# Patient Record
Sex: MALE | Race: WHITE | NOT HISPANIC OR LATINO | Employment: PART TIME
[De-identification: names, ages, dates, MRNs, and addresses within clinical notes are randomized per-mention and may not be internally consistent; named-entity substitution may affect disease eponyms.]

---

## 2017-01-20 ENCOUNTER — RECORDS - HEALTHEAST (OUTPATIENT)
Dept: ADMINISTRATIVE | Facility: OTHER | Age: 42
End: 2017-01-20

## 2018-10-29 ENCOUNTER — COMMUNICATION - HEALTHEAST (OUTPATIENT)
Dept: TELEHEALTH | Facility: CLINIC | Age: 43
End: 2018-10-29

## 2018-10-29 ENCOUNTER — OFFICE VISIT - HEALTHEAST (OUTPATIENT)
Dept: FAMILY MEDICINE | Facility: CLINIC | Age: 43
End: 2018-10-29

## 2018-10-29 DIAGNOSIS — J45.20 MILD INTERMITTENT ASTHMA WITHOUT COMPLICATION: ICD-10-CM

## 2018-10-29 DIAGNOSIS — Z13.1 SCREENING FOR DIABETES MELLITUS: ICD-10-CM

## 2018-10-29 DIAGNOSIS — F41.9 ANXIETY AND DEPRESSION: ICD-10-CM

## 2018-10-29 DIAGNOSIS — Z00.00 ROUTINE GENERAL MEDICAL EXAMINATION AT A HEALTH CARE FACILITY: ICD-10-CM

## 2018-10-29 DIAGNOSIS — G44.209 MUSCLE CONTRACTION HEADACHE: ICD-10-CM

## 2018-10-29 DIAGNOSIS — R41.840 POOR CONCENTRATION: ICD-10-CM

## 2018-10-29 DIAGNOSIS — F60.3 BORDERLINE PERSONALITY DISORDER (H): ICD-10-CM

## 2018-10-29 DIAGNOSIS — L98.9 SKIN LESION: ICD-10-CM

## 2018-10-29 DIAGNOSIS — Z13.220 SCREENING FOR LIPID DISORDERS: ICD-10-CM

## 2018-10-29 DIAGNOSIS — F32.A ANXIETY AND DEPRESSION: ICD-10-CM

## 2018-10-29 DIAGNOSIS — M54.2 NECK PAIN: ICD-10-CM

## 2018-10-29 LAB
CHOLEST SERPL-MCNC: 202 MG/DL
FASTING STATUS PATIENT QL REPORTED: YES
FASTING STATUS PATIENT QL REPORTED: YES
GLUCOSE BLD-MCNC: 85 MG/DL (ref 70–99)
HDLC SERPL-MCNC: 53 MG/DL
LDLC SERPL CALC-MCNC: 128 MG/DL
T4 FREE SERPL-MCNC: 1.1 NG/DL (ref 0.7–1.8)
TRIGL SERPL-MCNC: 106 MG/DL
TSH SERPL DL<=0.005 MIU/L-ACNC: 6.5 UIU/ML (ref 0.3–5)

## 2018-10-29 ASSESSMENT — MIFFLIN-ST. JEOR: SCORE: 1657.47

## 2018-10-29 NOTE — ASSESSMENT & PLAN NOTE
This patient presents to clinic with a myriad of smaller complaints.  We reviewed his family history.  He is up-to-date with screening recommendations.  He is slightly overweight but has an athletic build and his BMI over states any weight concerns.  We will check his cholesterol as this has not been checked previously.  We will screen him for diabetes.  Because of concentration and energy concerns, we also checked a TSH (strong family history of thyroid disorder and brother).  We will follow-up based on this examination.

## 2018-10-29 NOTE — ASSESSMENT & PLAN NOTE
The patient has a long history of poor concentration.  He also has a relatively blunted affect.  Given his poor concentration, and recommending neuropsychologic evaluation.  He says that he has worked with a psychiatrist in the past with diagnoses including borderline personality, anxiety, depression.  Close follow-up.

## 2018-10-29 NOTE — ASSESSMENT & PLAN NOTE
The patient's exam is normal.  Previously, exercise and stretching has been helpful.  His improvement has plateaued with home care.  I am recommending physical therapy.  If not improving with physical therapy, consider referral to spine care or additional imaging.  X-rays are not indicated today.

## 2018-10-29 NOTE — ASSESSMENT & PLAN NOTE
Lesion on scalp is likely benign.  It is in a location that is likely to cause discomfort.  Given that is relatively new we will watch it for an additional 1-2 months.  If it is still present andbothersome or recommend excision.  Differential includes cyst, lipoma, localized edema from previous injury.

## 2018-10-29 NOTE — ASSESSMENT & PLAN NOTE
Start diagnosis from a psychiatrist.  Those records are not available for review today.  The patient describes self-awareness symptoms of borderline personality disorder.  He also describes comorbid anxiety/depression/poor concentration.  No indication for pharmacotherapy at this time.  Consider antidepressants in the future if he is struggling or DBT.

## 2018-10-29 NOTE — ASSESSMENT & PLAN NOTE
The patient is describing symptoms of exercise-induced asthma.  His description of symptoms is actually mild in comparison to deficiencies on his ACT score.  We will start with an albuterol inhaler to be taken with exercise.  The patient should return to clinic in 4 weeks for follow-up visit to discuss progress.

## 2018-11-02 ENCOUNTER — COMMUNICATION - HEALTHEAST (OUTPATIENT)
Dept: FAMILY MEDICINE | Facility: CLINIC | Age: 43
End: 2018-11-02

## 2018-11-28 ENCOUNTER — COMMUNICATION - HEALTHEAST (OUTPATIENT)
Dept: FAMILY MEDICINE | Facility: CLINIC | Age: 43
End: 2018-11-28

## 2019-01-21 ENCOUNTER — COMMUNICATION - HEALTHEAST (OUTPATIENT)
Dept: FAMILY MEDICINE | Facility: CLINIC | Age: 44
End: 2019-01-21

## 2019-01-31 ENCOUNTER — COMMUNICATION - HEALTHEAST (OUTPATIENT)
Dept: TELEHEALTH | Facility: CLINIC | Age: 44
End: 2019-01-31

## 2019-01-31 ENCOUNTER — OFFICE VISIT - HEALTHEAST (OUTPATIENT)
Dept: FAMILY MEDICINE | Facility: CLINIC | Age: 44
End: 2019-01-31

## 2019-01-31 DIAGNOSIS — J45.21 MILD INTERMITTENT ASTHMA WITH EXACERBATION: ICD-10-CM

## 2019-01-31 DIAGNOSIS — E06.3 HYPOTHYROIDISM DUE TO HASHIMOTO'S THYROIDITIS: ICD-10-CM

## 2019-01-31 DIAGNOSIS — R41.840 POOR CONCENTRATION: ICD-10-CM

## 2019-01-31 DIAGNOSIS — J45.20 MILD INTERMITTENT ASTHMA WITHOUT COMPLICATION: ICD-10-CM

## 2019-01-31 LAB
T4 FREE SERPL-MCNC: 1 NG/DL (ref 0.7–1.8)
THYROID PEROXIDASE ANTIBODIES - HISTORICAL: 624.9 IU/ML (ref 0–5.6)
TSH SERPL DL<=0.005 MIU/L-ACNC: 5.37 UIU/ML (ref 0.3–5)

## 2019-01-31 ASSESSMENT — MIFFLIN-ST. JEOR: SCORE: 1679.58

## 2019-01-31 NOTE — ASSESSMENT & PLAN NOTE
The patient continues to have symptoms consistent with hypothyroidism although they are less severe than previously.  We will rechecktoday and anticipate that I will offer the patient thyroid replacement.  Start at 50 or 75 MCG per day.  Recheck in 6-8 weeks.  Check thyroid peroxidase antibodies.

## 2019-01-31 NOTE — ASSESSMENT & PLAN NOTE
Patient is expressing similar complaint of poor concentration.  He is comorbid anxiety and depression and is interested in neuropsychologic testing.  He was actually scheduled for this type of testing but missed his appointment and is hoping to be rescheduled.  -Referral was placed.  Patient understands that given his missed appointment in the past he may not get another opportunity.

## 2019-01-31 NOTE — ASSESSMENT & PLAN NOTE
Persistent symptoms.  Inadequately treated.  Recommending a trial of an inhaler to be taken before exercise.  If this is ineffective or if his symptoms worsen, he will start a controller.  Follow-up in clinic in 4 weeks for reassessment.  Consider spirometry at that time.

## 2019-02-01 ENCOUNTER — AMBULATORY - HEALTHEAST (OUTPATIENT)
Dept: FAMILY MEDICINE | Facility: CLINIC | Age: 44
End: 2019-02-01

## 2019-02-01 DIAGNOSIS — E06.3 HYPOTHYROIDISM DUE TO HASHIMOTO'S THYROIDITIS: ICD-10-CM

## 2019-02-12 ENCOUNTER — COMMUNICATION - HEALTHEAST (OUTPATIENT)
Dept: FAMILY MEDICINE | Facility: CLINIC | Age: 44
End: 2019-02-12

## 2019-03-07 ENCOUNTER — OFFICE VISIT - HEALTHEAST (OUTPATIENT)
Dept: FAMILY MEDICINE | Facility: CLINIC | Age: 44
End: 2019-03-07

## 2019-03-07 DIAGNOSIS — J45.31 MILD PERSISTENT ASTHMA WITH EXACERBATION: ICD-10-CM

## 2019-03-07 DIAGNOSIS — J45.21 MILD INTERMITTENT ASTHMA WITH EXACERBATION: ICD-10-CM

## 2019-03-07 DIAGNOSIS — E06.3 HYPOTHYROIDISM DUE TO HASHIMOTO'S THYROIDITIS: ICD-10-CM

## 2019-03-07 NOTE — ASSESSMENT & PLAN NOTE
Energy remains low.  Patient is interested in starting therapy.  Discussed potential side effects.   - start synthroid at 50 mcg   - recheck TSH is 6-10 weeks.

## 2019-03-07 NOTE — ASSESSMENT & PLAN NOTE
Symptoms are persistent.  Poorly controlled asthma.  Using rescue inhaler regularly.    - start flovent.   -Continue singular.   - recheck in 6 weeks.

## 2019-04-25 ENCOUNTER — OFFICE VISIT - HEALTHEAST (OUTPATIENT)
Dept: FAMILY MEDICINE | Facility: CLINIC | Age: 44
End: 2019-04-25

## 2019-04-25 DIAGNOSIS — E06.3 HYPOTHYROIDISM DUE TO HASHIMOTO'S THYROIDITIS: ICD-10-CM

## 2019-04-25 DIAGNOSIS — J45.31 MILD PERSISTENT ASTHMA WITH EXACERBATION: ICD-10-CM

## 2019-04-25 NOTE — ASSESSMENT & PLAN NOTE
Asthma is poorly controlled.  -Increase therapy.  Breo was prescribed to both increase and simplify his therapy.  Continue rescue inhaler as needed.

## 2019-04-25 NOTE — ASSESSMENT & PLAN NOTE
Checked of improvement in energy.  Weight loss which may or may not be related to thyroid treatment.  -Check TSH.

## 2019-04-26 LAB — TSH SERPL DL<=0.005 MIU/L-ACNC: 1.53 UIU/ML (ref 0.3–5)

## 2019-04-29 ENCOUNTER — AMBULATORY - HEALTHEAST (OUTPATIENT)
Dept: FAMILY MEDICINE | Facility: CLINIC | Age: 44
End: 2019-04-29

## 2019-04-29 DIAGNOSIS — E06.3 HYPOTHYROIDISM DUE TO HASHIMOTO'S THYROIDITIS: ICD-10-CM

## 2019-06-06 ENCOUNTER — COMMUNICATION - HEALTHEAST (OUTPATIENT)
Dept: FAMILY MEDICINE | Facility: CLINIC | Age: 44
End: 2019-06-06

## 2019-09-05 ENCOUNTER — COMMUNICATION - HEALTHEAST (OUTPATIENT)
Dept: FAMILY MEDICINE | Facility: CLINIC | Age: 44
End: 2019-09-05

## 2019-11-04 ENCOUNTER — OFFICE VISIT - HEALTHEAST (OUTPATIENT)
Dept: FAMILY MEDICINE | Facility: CLINIC | Age: 44
End: 2019-11-04

## 2019-11-04 DIAGNOSIS — E06.3 HYPOTHYROIDISM DUE TO HASHIMOTO'S THYROIDITIS: ICD-10-CM

## 2019-11-04 DIAGNOSIS — Z13.220 SCREENING FOR LIPID DISORDERS: ICD-10-CM

## 2019-11-04 DIAGNOSIS — F32.A ANXIETY AND DEPRESSION: ICD-10-CM

## 2019-11-04 DIAGNOSIS — Z13.1 SCREENING FOR DIABETES MELLITUS: ICD-10-CM

## 2019-11-04 DIAGNOSIS — B36.0 TINEA VERSICOLOR: ICD-10-CM

## 2019-11-04 DIAGNOSIS — F41.9 ANXIETY AND DEPRESSION: ICD-10-CM

## 2019-11-04 DIAGNOSIS — Z00.00 ROUTINE GENERAL MEDICAL EXAMINATION AT A HEALTH CARE FACILITY: ICD-10-CM

## 2019-11-04 DIAGNOSIS — J45.31 MILD PERSISTENT ASTHMA WITH EXACERBATION: ICD-10-CM

## 2019-11-04 ASSESSMENT — ANXIETY QUESTIONNAIRES
1. FEELING NERVOUS, ANXIOUS, OR ON EDGE: NEARLY EVERY DAY
6. BECOMING EASILY ANNOYED OR IRRITABLE: MORE THAN HALF THE DAYS
7. FEELING AFRAID AS IF SOMETHING AWFUL MIGHT HAPPEN: MORE THAN HALF THE DAYS
3. WORRYING TOO MUCH ABOUT DIFFERENT THINGS: NEARLY EVERY DAY
IF YOU CHECKED OFF ANY PROBLEMS ON THIS QUESTIONNAIRE, HOW DIFFICULT HAVE THESE PROBLEMS MADE IT FOR YOU TO DO YOUR WORK, TAKE CARE OF THINGS AT HOME, OR GET ALONG WITH OTHER PEOPLE: NOT DIFFICULT AT ALL
2. NOT BEING ABLE TO STOP OR CONTROL WORRYING: NEARLY EVERY DAY
4. TROUBLE RELAXING: SEVERAL DAYS
5. BEING SO RESTLESS THAT IT IS HARD TO SIT STILL: SEVERAL DAYS
GAD7 TOTAL SCORE: 15

## 2019-11-04 ASSESSMENT — MIFFLIN-ST. JEOR: SCORE: 1639.89

## 2019-11-04 ASSESSMENT — PATIENT HEALTH QUESTIONNAIRE - PHQ9: SUM OF ALL RESPONSES TO PHQ QUESTIONS 1-9: 18

## 2019-11-04 NOTE — ASSESSMENT & PLAN NOTE
The patient presents for an annual exam.  He says that since I last saw him he has had clarificationof his mental health diagnoses.  He is been struggling to maintain a job and has been working with a psychologist at [facility].  He now has diagnoses including autism spectrum disorder, PTSD, generalized anxiety disorder possible depression.  These notes are not available for review.  I did complete paperwork stating that he has had a physical exam as one component of a disability application which was started through hismental health provider.  There is no obvious organic etiology for the symptoms that they are identifying.  He will return to clinic for fasting lab work later this week.  He is overdue for a TSH measurement.  He is compliant with his levothyroxine.   -Treat tinea versicolor with Selsun shampoo.

## 2019-11-04 NOTE — ASSESSMENT & PLAN NOTE
The patient's affect is fairly neutral today although given his description of symptoms and dysfunction socially, the disability application does seem reasonable.  He anticipates that he will be starting medications later this year which will hopefully help facilitate his transition back into the workforce.  The patient is recently engaged to a woman from this community.  Is currently unemployed.

## 2019-11-04 NOTE — ASSESSMENT & PLAN NOTE
Currently poorly controlled.  He is not on a controller.  We will try Symbicort for coverage.  [place] was prohibitively expensive.  Continue albuterol as needed.

## 2019-11-07 ENCOUNTER — AMBULATORY - HEALTHEAST (OUTPATIENT)
Dept: LAB | Facility: CLINIC | Age: 44
End: 2019-11-07

## 2019-11-07 DIAGNOSIS — Z00.00 ROUTINE GENERAL MEDICAL EXAMINATION AT A HEALTH CARE FACILITY: ICD-10-CM

## 2019-11-07 DIAGNOSIS — E06.3 HYPOTHYROIDISM DUE TO HASHIMOTO'S THYROIDITIS: ICD-10-CM

## 2019-11-07 DIAGNOSIS — Z13.1 SCREENING FOR DIABETES MELLITUS: ICD-10-CM

## 2019-11-07 DIAGNOSIS — Z13.220 SCREENING FOR LIPID DISORDERS: ICD-10-CM

## 2019-11-07 LAB
ALT SERPL W P-5'-P-CCNC: 38 U/L (ref 0–45)
ANION GAP SERPL CALCULATED.3IONS-SCNC: 7 MMOL/L (ref 5–18)
BUN SERPL-MCNC: 18 MG/DL (ref 8–22)
CALCIUM SERPL-MCNC: 10.2 MG/DL (ref 8.5–10.5)
CHLORIDE BLD-SCNC: 102 MMOL/L (ref 98–107)
CHOLEST SERPL-MCNC: 198 MG/DL
CO2 SERPL-SCNC: 29 MMOL/L (ref 22–31)
CREAT SERPL-MCNC: 1.17 MG/DL (ref 0.7–1.3)
FASTING STATUS PATIENT QL REPORTED: YES
GFR SERPL CREATININE-BSD FRML MDRD: >60 ML/MIN/1.73M2
GLUCOSE BLD-MCNC: 80 MG/DL (ref 70–125)
HDLC SERPL-MCNC: 47 MG/DL
LDLC SERPL CALC-MCNC: 131 MG/DL
POTASSIUM BLD-SCNC: 4 MMOL/L (ref 3.5–5)
SODIUM SERPL-SCNC: 138 MMOL/L (ref 136–145)
TRIGL SERPL-MCNC: 101 MG/DL
TSH SERPL DL<=0.005 MIU/L-ACNC: 2.15 UIU/ML (ref 0.3–5)

## 2019-11-08 ENCOUNTER — AMBULATORY - HEALTHEAST (OUTPATIENT)
Dept: FAMILY MEDICINE | Facility: CLINIC | Age: 44
End: 2019-11-08

## 2019-11-08 DIAGNOSIS — E06.3 HYPOTHYROIDISM DUE TO HASHIMOTO'S THYROIDITIS: ICD-10-CM

## 2020-01-07 ENCOUNTER — RECORDS - HEALTHEAST (OUTPATIENT)
Dept: ADMINISTRATIVE | Facility: OTHER | Age: 45
End: 2020-01-07

## 2020-02-10 ENCOUNTER — COMMUNICATION - HEALTHEAST (OUTPATIENT)
Dept: FAMILY MEDICINE | Facility: CLINIC | Age: 45
End: 2020-02-10

## 2020-03-23 ENCOUNTER — RECORDS - HEALTHEAST (OUTPATIENT)
Dept: ADMINISTRATIVE | Facility: OTHER | Age: 45
End: 2020-03-23

## 2020-06-04 ENCOUNTER — RECORDS - HEALTHEAST (OUTPATIENT)
Dept: ADMINISTRATIVE | Facility: OTHER | Age: 45
End: 2020-06-04

## 2020-06-08 ENCOUNTER — COMMUNICATION - HEALTHEAST (OUTPATIENT)
Dept: FAMILY MEDICINE | Facility: CLINIC | Age: 45
End: 2020-06-08

## 2020-06-23 ENCOUNTER — COMMUNICATION - HEALTHEAST (OUTPATIENT)
Dept: FAMILY MEDICINE | Facility: CLINIC | Age: 45
End: 2020-06-23

## 2020-06-23 DIAGNOSIS — E06.3 HYPOTHYROIDISM DUE TO HASHIMOTO'S THYROIDITIS: ICD-10-CM

## 2020-08-11 ENCOUNTER — OFFICE VISIT - HEALTHEAST (OUTPATIENT)
Dept: FAMILY MEDICINE | Facility: CLINIC | Age: 45
End: 2020-08-11

## 2020-08-11 DIAGNOSIS — B36.0 TINEA VERSICOLOR: ICD-10-CM

## 2020-08-11 DIAGNOSIS — M54.12 CERVICAL RADICULOPATHY: ICD-10-CM

## 2020-08-11 DIAGNOSIS — J45.31 MILD PERSISTENT ASTHMA WITH EXACERBATION: ICD-10-CM

## 2020-08-11 ASSESSMENT — PATIENT HEALTH QUESTIONNAIRE - PHQ9: SUM OF ALL RESPONSES TO PHQ QUESTIONS 1-9: 3

## 2020-08-11 NOTE — ASSESSMENT & PLAN NOTE
The patient's symptoms of tinea versicolor did not resolve over the winter months.  They have been worse in the summer as he has beenworking outside.  His exam is similar to what was observed over the winter months.  Given lack of improvement with topical therapy we will increase to oral therapy.  Fluconazole weekly dosing for total 4 doses was prescribed.  At this point, I do not believe that the diagnosis is in question but we would consider KOH scraping, skin biopsy if refractory.  Would also consider treating empirically with a topical steroid.

## 2020-08-11 NOTE — ASSESSMENT & PLAN NOTE
New complaint today was essentially normal exam.  Symptoms are intermittent.  He has discussed this previously with orthopedics.  Reviewed a note from 2016.  At that time, conservative treatment was recommended.  Given that it is only intermittently bothersome I have also recommended a conservative course.  Scheduled anti-inflammatories recommended in the form of naproxen.  Physical therapy referral placed.  If the radicular symptoms worsen, we will consider imaging versus orthopedic referral.  He may benefit from a steroid injection but a location would need to be identified on CT or MRI.

## 2020-09-14 ENCOUNTER — RECORDS - HEALTHEAST (OUTPATIENT)
Dept: ADMINISTRATIVE | Facility: OTHER | Age: 45
End: 2020-09-14

## 2021-01-11 ENCOUNTER — RECORDS - HEALTHEAST (OUTPATIENT)
Dept: ADMINISTRATIVE | Facility: OTHER | Age: 46
End: 2021-01-11

## 2021-01-14 ENCOUNTER — COMMUNICATION - HEALTHEAST (OUTPATIENT)
Dept: FAMILY MEDICINE | Facility: CLINIC | Age: 46
End: 2021-01-14

## 2021-01-15 ENCOUNTER — OFFICE VISIT - HEALTHEAST (OUTPATIENT)
Dept: FAMILY MEDICINE | Facility: CLINIC | Age: 46
End: 2021-01-15

## 2021-01-15 DIAGNOSIS — F41.1 GENERALIZED ANXIETY DISORDER: ICD-10-CM

## 2021-01-15 DIAGNOSIS — Z09 HOSPITAL DISCHARGE FOLLOW-UP: ICD-10-CM

## 2021-01-15 DIAGNOSIS — J45.20 MILD INTERMITTENT ASTHMA WITHOUT COMPLICATION: ICD-10-CM

## 2021-01-15 DIAGNOSIS — F60.3 BORDERLINE PERSONALITY DISORDER (H): ICD-10-CM

## 2021-01-15 DIAGNOSIS — T50.902D SUICIDE ATTEMPT BY DRUG INGESTION, SUBSEQUENT ENCOUNTER: ICD-10-CM

## 2021-01-15 DIAGNOSIS — E06.3 HYPOTHYROIDISM DUE TO HASHIMOTO'S THYROIDITIS: ICD-10-CM

## 2021-01-15 DIAGNOSIS — R74.01 ELEVATED ALT MEASUREMENT: ICD-10-CM

## 2021-01-15 DIAGNOSIS — M54.12 CERVICAL RADICULOPATHY: ICD-10-CM

## 2021-01-15 DIAGNOSIS — B36.0 TINEA VERSICOLOR: ICD-10-CM

## 2021-01-15 DIAGNOSIS — F33.2 SEVERE EPISODE OF RECURRENT MAJOR DEPRESSIVE DISORDER, WITHOUT PSYCHOTIC FEATURES (H): ICD-10-CM

## 2021-01-15 DIAGNOSIS — J45.40 MODERATE PERSISTENT ASTHMA WITHOUT COMPLICATION: ICD-10-CM

## 2021-01-15 LAB
ALBUMIN SERPL-MCNC: 4.1 G/DL (ref 3.5–5)
ALP SERPL-CCNC: 74 U/L (ref 45–120)
ALT SERPL W P-5'-P-CCNC: 52 U/L (ref 0–45)
AST SERPL W P-5'-P-CCNC: 25 U/L (ref 0–40)
BILIRUB DIRECT SERPL-MCNC: 0.2 MG/DL
BILIRUB SERPL-MCNC: 0.6 MG/DL (ref 0–1)
PROT SERPL-MCNC: 7.2 G/DL (ref 6–8)
TSH SERPL DL<=0.005 MIU/L-ACNC: 1.62 UIU/ML (ref 0.3–5)

## 2021-01-15 ASSESSMENT — ANXIETY QUESTIONNAIRES
7. FEELING AFRAID AS IF SOMETHING AWFUL MIGHT HAPPEN: NEARLY EVERY DAY
2. NOT BEING ABLE TO STOP OR CONTROL WORRYING: NEARLY EVERY DAY
IF YOU CHECKED OFF ANY PROBLEMS ON THIS QUESTIONNAIRE, HOW DIFFICULT HAVE THESE PROBLEMS MADE IT FOR YOU TO DO YOUR WORK, TAKE CARE OF THINGS AT HOME, OR GET ALONG WITH OTHER PEOPLE: EXTREMELY DIFFICULT
1. FEELING NERVOUS, ANXIOUS, OR ON EDGE: NEARLY EVERY DAY
GAD7 TOTAL SCORE: 21
4. TROUBLE RELAXING: NEARLY EVERY DAY
3. WORRYING TOO MUCH ABOUT DIFFERENT THINGS: NEARLY EVERY DAY
6. BECOMING EASILY ANNOYED OR IRRITABLE: NEARLY EVERY DAY
5. BEING SO RESTLESS THAT IT IS HARD TO SIT STILL: NEARLY EVERY DAY

## 2021-01-15 ASSESSMENT — PATIENT HEALTH QUESTIONNAIRE - PHQ9: SUM OF ALL RESPONSES TO PHQ QUESTIONS 1-9: 27

## 2021-01-15 NOTE — ASSESSMENT & PLAN NOTE
Neck pain we discussed earlier this summer has been worsening.  He has some radicular symptoms into his right side.  He requests referral.  We discussed anti-inflammatories versus physical therapy versus referral.  Referral placed.

## 2021-01-15 NOTE — ASSESSMENT & PLAN NOTE
Times are intermittent.  Patient has not responded to topical therapies.  We will treat with a short course of Diflucan as we did successfully in the past.

## 2021-01-15 NOTE — ASSESSMENT & PLAN NOTE
Emergency department/hospital follow-up.  The patient was not formally admitted/observed however he did spend several days under observation while they were attempting to find a mental health bed given his suicidal gesture of ingestion.  The ingestion itself was a bit unclear but it did include prescription medications.  He had elevated transaminases and we will check an hepatic profile to confirm that this is improving as he gets more distant.  He denies suicidal ideation today.  His significant other was also recently hospitalized (3 weeks) for mental health reasonsand was discharged.  They live together.  Patient states that he has follow-up with his psychiatrist and with his psychologist and he believes that he has the resources to follow through the treatment plan as determined by the hospital last week.  Family is in the area and is supportive.  Stress has been high in the context of the COVID-19 pandemic, inability work because of the pandemic as well as his mental health.  Close follow-up recommended.

## 2021-01-15 NOTE — ASSESSMENT & PLAN NOTE
Overall mental health plan reviewed.  Relatively speaking coming doing well today.  Continue to follow with psychiatry/psychology.

## 2021-01-15 NOTE — ASSESSMENT & PLAN NOTE
Not a focus of today's visit but patient states that he is doing well.  Refill of albuterol sent to pharmacy.

## 2021-01-25 ENCOUNTER — COMMUNICATION - HEALTHEAST (OUTPATIENT)
Dept: PHYSICAL MEDICINE AND REHAB | Facility: CLINIC | Age: 46
End: 2021-01-25

## 2021-02-09 ENCOUNTER — COMMUNICATION - HEALTHEAST (OUTPATIENT)
Dept: FAMILY MEDICINE | Facility: CLINIC | Age: 46
End: 2021-02-09

## 2021-02-09 DIAGNOSIS — E06.3 HYPOTHYROIDISM DUE TO HASHIMOTO'S THYROIDITIS: ICD-10-CM

## 2021-02-26 ENCOUNTER — RECORDS - HEALTHEAST (OUTPATIENT)
Dept: ADMINISTRATIVE | Facility: OTHER | Age: 46
End: 2021-02-26

## 2021-04-06 ENCOUNTER — OFFICE VISIT - HEALTHEAST (OUTPATIENT)
Dept: FAMILY MEDICINE | Facility: CLINIC | Age: 46
End: 2021-04-06

## 2021-04-06 DIAGNOSIS — Z13.1 SCREENING FOR DIABETES MELLITUS: ICD-10-CM

## 2021-04-06 DIAGNOSIS — E06.3 HYPOTHYROIDISM DUE TO HASHIMOTO'S THYROIDITIS: ICD-10-CM

## 2021-04-06 DIAGNOSIS — Z11.59 ENCOUNTER FOR HEPATITIS C SCREENING TEST FOR LOW RISK PATIENT: ICD-10-CM

## 2021-04-06 DIAGNOSIS — T50.902D SUICIDE ATTEMPT BY DRUG INGESTION, SUBSEQUENT ENCOUNTER: ICD-10-CM

## 2021-04-06 DIAGNOSIS — Z00.00 ROUTINE GENERAL MEDICAL EXAMINATION AT A HEALTH CARE FACILITY: ICD-10-CM

## 2021-04-06 DIAGNOSIS — R03.0 ELEVATED BP WITHOUT DIAGNOSIS OF HYPERTENSION: ICD-10-CM

## 2021-04-06 DIAGNOSIS — N52.2 DRUG-INDUCED ERECTILE DYSFUNCTION: ICD-10-CM

## 2021-04-06 DIAGNOSIS — Z11.4 SCREENING FOR HIV WITHOUT PRESENCE OF RISK FACTORS: ICD-10-CM

## 2021-04-06 DIAGNOSIS — J45.40 MODERATE PERSISTENT ASTHMA WITHOUT COMPLICATION: ICD-10-CM

## 2021-04-06 DIAGNOSIS — Z13.220 SCREENING FOR LIPID DISORDERS: ICD-10-CM

## 2021-04-06 DIAGNOSIS — F33.2 SEVERE EPISODE OF RECURRENT MAJOR DEPRESSIVE DISORDER, WITHOUT PSYCHOTIC FEATURES (H): ICD-10-CM

## 2021-04-06 DIAGNOSIS — E66.3 OVERWEIGHT WITH BODY MASS INDEX (BMI) OF 26 TO 26.9 IN ADULT: ICD-10-CM

## 2021-04-06 DIAGNOSIS — R74.01 ELEVATED ALT MEASUREMENT: ICD-10-CM

## 2021-04-06 LAB
ALBUMIN SERPL-MCNC: 3.9 G/DL (ref 3.5–5)
ALP SERPL-CCNC: 68 U/L (ref 45–120)
ALT SERPL W P-5'-P-CCNC: 111 U/L (ref 0–45)
ANION GAP SERPL CALCULATED.3IONS-SCNC: 10 MMOL/L (ref 5–18)
AST SERPL W P-5'-P-CCNC: 50 U/L (ref 0–40)
BILIRUB SERPL-MCNC: 0.7 MG/DL (ref 0–1)
BUN SERPL-MCNC: 15 MG/DL (ref 8–22)
CALCIUM SERPL-MCNC: 9.1 MG/DL (ref 8.5–10.5)
CHLORIDE BLD-SCNC: 105 MMOL/L (ref 98–107)
CHOLEST SERPL-MCNC: 208 MG/DL
CO2 SERPL-SCNC: 25 MMOL/L (ref 22–31)
CREAT SERPL-MCNC: 1.11 MG/DL (ref 0.7–1.3)
FASTING STATUS PATIENT QL REPORTED: YES
GFR SERPL CREATININE-BSD FRML MDRD: >60 ML/MIN/1.73M2
GLUCOSE BLD-MCNC: 81 MG/DL (ref 70–125)
HDLC SERPL-MCNC: 49 MG/DL
HIV 1+2 AB+HIV1 P24 AG SERPL QL IA: NEGATIVE
LDLC SERPL CALC-MCNC: 135 MG/DL
POTASSIUM BLD-SCNC: 4.7 MMOL/L (ref 3.5–5)
PROT SERPL-MCNC: 6.8 G/DL (ref 6–8)
SODIUM SERPL-SCNC: 140 MMOL/L (ref 136–145)
TRIGL SERPL-MCNC: 122 MG/DL

## 2021-04-06 RX ORDER — BUDESONIDE AND FORMOTEROL FUMARATE DIHYDRATE 160; 4.5 UG/1; UG/1
2 AEROSOL RESPIRATORY (INHALATION) 2 TIMES DAILY
Qty: 1 INHALER | Refills: 12 | Status: SHIPPED | OUTPATIENT
Start: 2021-04-06

## 2021-04-06 ASSESSMENT — ANXIETY QUESTIONNAIRES
6. BECOMING EASILY ANNOYED OR IRRITABLE: MORE THAN HALF THE DAYS
7. FEELING AFRAID AS IF SOMETHING AWFUL MIGHT HAPPEN: NOT AT ALL
1. FEELING NERVOUS, ANXIOUS, OR ON EDGE: SEVERAL DAYS
5. BEING SO RESTLESS THAT IT IS HARD TO SIT STILL: NOT AT ALL
GAD7 TOTAL SCORE: 4
2. NOT BEING ABLE TO STOP OR CONTROL WORRYING: SEVERAL DAYS
4. TROUBLE RELAXING: NOT AT ALL
IF YOU CHECKED OFF ANY PROBLEMS ON THIS QUESTIONNAIRE, HOW DIFFICULT HAVE THESE PROBLEMS MADE IT FOR YOU TO DO YOUR WORK, TAKE CARE OF THINGS AT HOME, OR GET ALONG WITH OTHER PEOPLE: SOMEWHAT DIFFICULT
3. WORRYING TOO MUCH ABOUT DIFFERENT THINGS: NOT AT ALL

## 2021-04-06 ASSESSMENT — PATIENT HEALTH QUESTIONNAIRE - PHQ9: SUM OF ALL RESPONSES TO PHQ QUESTIONS 1-9: 6

## 2021-04-06 ASSESSMENT — MIFFLIN-ST. JEOR: SCORE: 1712.46

## 2021-04-06 NOTE — ASSESSMENT & PLAN NOTE
Interval improvement.  He says that overall things are going quite well.  He is looking forward to using of work restrictions with theCOVID-19 pandemic.  Weight gain associated with olanzapine and venlafaxine.  Sexual dysfunction associated with these medications as well.  He is working with a psychiatrist.

## 2021-04-06 NOTE — ASSESSMENT & PLAN NOTE
A number of concerns today were discussed.  He is working to improve his weight and lifestyle through dietary changes and increase physical activity.  Weight gain attributed to medications.  He is up-to-date with preventative health recommendations.  He wonders if he still needs to be on levothyroxine.  We will consider tetanus booster at his next visit.

## 2021-04-06 NOTE — ASSESSMENT & PLAN NOTE
Likely iatrogenic.  He has been trying to address with dietary changes and exercise.  We will reevaluate in 6 weeks.

## 2021-04-06 NOTE — ASSESSMENT & PLAN NOTE
Currently symptomatic and using his rescue inhaler frequently.  We will increase his Symbicort dose.  He is taking this medication asprescribed.  Reevaluation in 6 weeks recommended.

## 2021-04-06 NOTE — ASSESSMENT & PLAN NOTE
The patient is interested in decreasing his overall medication burden and wonders if he still needs to be on levothyroxine.  We discussed waiting until his weight is under better control versus a trial off of the medicine.  He was mildly symptomatic at time of diagnosis of Hashimoto's.  We will check a TSH in 6 weeks.  DC Synthroid.  Patient becomes symptomatic, he will contact clinic and I will resume the medication.

## 2021-04-07 LAB — HCV AB SERPL QL IA: NEGATIVE

## 2021-04-16 ENCOUNTER — COMMUNICATION - HEALTHEAST (OUTPATIENT)
Dept: SCHEDULING | Facility: CLINIC | Age: 46
End: 2021-04-16

## 2021-04-21 ENCOUNTER — RECORDS - HEALTHEAST (OUTPATIENT)
Dept: ADMINISTRATIVE | Facility: OTHER | Age: 46
End: 2021-04-21

## 2021-05-18 ENCOUNTER — OFFICE VISIT - HEALTHEAST (OUTPATIENT)
Dept: FAMILY MEDICINE | Facility: CLINIC | Age: 46
End: 2021-05-18

## 2021-05-18 DIAGNOSIS — E06.3 HYPOTHYROIDISM DUE TO HASHIMOTO'S THYROIDITIS: ICD-10-CM

## 2021-05-18 DIAGNOSIS — R03.0 ELEVATED BP WITHOUT DIAGNOSIS OF HYPERTENSION: ICD-10-CM

## 2021-05-18 DIAGNOSIS — J45.40 MODERATE PERSISTENT ASTHMA WITHOUT COMPLICATION: ICD-10-CM

## 2021-05-18 DIAGNOSIS — J45.20 MILD INTERMITTENT ASTHMA WITHOUT COMPLICATION: ICD-10-CM

## 2021-05-18 DIAGNOSIS — N52.2 DRUG-INDUCED ERECTILE DYSFUNCTION: ICD-10-CM

## 2021-05-18 DIAGNOSIS — R74.01 ELEVATED ALT MEASUREMENT: ICD-10-CM

## 2021-05-18 LAB
ALBUMIN SERPL-MCNC: 4.2 G/DL (ref 3.5–5)
ALP SERPL-CCNC: 77 U/L (ref 45–120)
ALT SERPL W P-5'-P-CCNC: 31 U/L (ref 0–45)
AST SERPL W P-5'-P-CCNC: 21 U/L (ref 0–40)
BILIRUB DIRECT SERPL-MCNC: 0.3 MG/DL
BILIRUB SERPL-MCNC: 1 MG/DL (ref 0–1)
PROT SERPL-MCNC: 7.3 G/DL (ref 6–8)
TSH SERPL DL<=0.005 MIU/L-ACNC: 2.18 UIU/ML (ref 0.3–5)

## 2021-05-18 RX ORDER — ALBUTEROL SULFATE 90 UG/1
2 AEROSOL, METERED RESPIRATORY (INHALATION) EVERY 6 HOURS PRN
Qty: 1 EACH | Refills: 5 | Status: SHIPPED | OUTPATIENT
Start: 2021-05-18

## 2021-05-18 NOTE — ASSESSMENT & PLAN NOTE
Anticipate improvement with dietary changes and exercise.  Check hepatic profile.  He has not been screened for hepatitis as well.

## 2021-05-18 NOTE — ASSESSMENT & PLAN NOTE
Improved.  Not using symbicort.  Also does not need albuterol.  I will leave symbicort on his chart as I think he may need seasonally.

## 2021-05-19 LAB
HAV IGM SERPL QL IA: NEGATIVE
HBV CORE IGM SERPL QL IA: NEGATIVE
HBV SURFACE AG SERPL QL IA: NEGATIVE
HCV AB SERPL QL IA: NEGATIVE

## 2021-05-26 ASSESSMENT — PATIENT HEALTH QUESTIONNAIRE - PHQ9: SUM OF ALL RESPONSES TO PHQ QUESTIONS 1-9: 18

## 2021-05-27 VITALS
WEIGHT: 184 LBS | HEART RATE: 92 BPM | RESPIRATION RATE: 20 BRPM | DIASTOLIC BLOOD PRESSURE: 86 MMHG | SYSTOLIC BLOOD PRESSURE: 134 MMHG | OXYGEN SATURATION: 98 %

## 2021-05-27 ASSESSMENT — PATIENT HEALTH QUESTIONNAIRE - PHQ9
SUM OF ALL RESPONSES TO PHQ QUESTIONS 1-9: 6
SUM OF ALL RESPONSES TO PHQ QUESTIONS 1-9: 3
SUM OF ALL RESPONSES TO PHQ QUESTIONS 1-9: 27

## 2021-05-28 ASSESSMENT — ASTHMA QUESTIONNAIRES
ACT_TOTALSCORE: 14
ACT_TOTALSCORE: 23
ACT_TOTALSCORE: 25
ACT_TOTALSCORE: 17

## 2021-05-28 ASSESSMENT — ANXIETY QUESTIONNAIRES
GAD7 TOTAL SCORE: 4
GAD7 TOTAL SCORE: 21
GAD7 TOTAL SCORE: 15

## 2021-05-28 NOTE — PROGRESS NOTES
Assessment/Plan:    Mild persistent asthma with exacerbation  Asthma is poorly controlled.  -Increase therapy.  Breo was prescribed to both increase and simplify his therapy.  Continue rescue inhaler as needed.    Hypothyroidism due to Hashimoto's thyroiditis  Checked of improvement in energy.  Weight loss which may or may not be related to thyroid treatment.  -Check TSH.    Return in about 6 weeks (around 6/6/2019) for recheck follow-up visit, asthma.    Jean Carlos Roa MD  _______________________________    Chief Complaint   Patient presents with     Follow-up     asthma      Subjective: German Tinajero is a 43 y.o. year old male who returns to clinic for the following chronic complaints/concerns:     Asthma follow-up: The patient says that he continues to struggle with asthma.,  In particular as he recently had a upper respiratory infection where he was coughing with copious amounts of nasal discharge.  No fevers.  Overall, the symptoms have mostly subsided.  He has been taking the controller medication as prescribed.  He is also been using his rescue inhaler quite regularly.  Overall, the subjective sense of a need to catch his breath has improved.  He states that he has a good understanding of how to take his inhalers.  Montelukast has been helpful with his allergy symptoms.  Occasionally have to take a second generation antihistamine if he has rhinorrhea.    Hypothyroidism: He is taking his medication.  He says that his energy is improved.  He is happy to report that he has lost weight does not believe that he has done anything in particular to try this process forward.  He does not identify any particular side effects to this medication.    Review of systems is negative except for as shown in the HPI.    The following portions of the patient's history were reviewed and updated as appropriate: allergies, current medications, past medical history and problem list.    Objective:    weight is 178 lb (80.7 kg).  His oral temperature is 97.8  F (36.6  C). His blood pressure is 126/66 and his pulse is 88. His respiration is 18 and oxygen saturation is 98%.   Gen: No acute distress, pleasant.  Psych: Normal affect    No results found for this or any previous visit (from the past 24 hour(s)).    A CT was performed today but represents symptoms while in mild exacerbation with recent viral infection.    Additional History from Old Records Summarized (2): no  Decision to Obtain Records (1): no  Radiology Tests Summarized or Ordered (1): no  Labs Reviewed or Ordered (1): no  Medicine Test Summarized or Ordered (1): no  Independent Review of EKG or X-RAY(2 each): no    This note has been dictated using voice recognition software. Any grammatical or context distortions are unintentional and inherent to the software

## 2021-06-01 NOTE — TELEPHONE ENCOUNTER
Left message to call back for: German  Information to relay to patient:  See message below and get information    Thank you,  Carmela Wells LPN

## 2021-06-01 NOTE — TELEPHONE ENCOUNTER
Fax received from [facility] pharmacy requesting Prior Authorization    Medication Name:   fluticasone furoate-vilanterol (BREO ELLIPTA) 200-25 mcg/dose DsDv inhaler 28 each 5 4/25/2019     Sig - Route: Inhale 1 puff daily. - Inhalation    Sent to pharmacy as: fluticasone furoate-vilanterol (BREO ELLIPTA) 200-25 mcg/dose DsDv inhaler    E-Prescribing Status: Receipt confirmed by pharmacy (4/25/2019  3:51 PM CDT)          Insurance Plan: [employer]   PB:    Patient ID Number: [id number]    Please start PA process

## 2021-06-01 NOTE — TELEPHONE ENCOUNTER
I attempted to call patient and he states he is too busy to talk right now, I will call him back at a later time    Carmela Wells LPN

## 2021-06-01 NOTE — TELEPHONE ENCOUNTER
Left message to call back for: German  Information to relay to patient:  See message below    Carmela Wells LPN

## 2021-06-02 VITALS — WEIGHT: 179 LBS | HEIGHT: 68 IN | BODY MASS INDEX: 27.13 KG/M2

## 2021-06-02 VITALS — HEIGHT: 68 IN | WEIGHT: 183 LBS | BODY MASS INDEX: 27.74 KG/M2

## 2021-06-02 VITALS — BODY MASS INDEX: 28.13 KG/M2 | WEIGHT: 185 LBS

## 2021-06-02 VITALS — WEIGHT: 178 LBS | BODY MASS INDEX: 27.06 KG/M2

## 2021-06-03 VITALS
BODY MASS INDEX: 24.2 KG/M2 | RESPIRATION RATE: 20 BRPM | WEIGHT: 169 LBS | SYSTOLIC BLOOD PRESSURE: 128 MMHG | OXYGEN SATURATION: 98 % | TEMPERATURE: 98.5 F | HEIGHT: 70 IN | HEART RATE: 76 BPM | DIASTOLIC BLOOD PRESSURE: 80 MMHG

## 2021-06-03 NOTE — PATIENT INSTRUCTIONS - HE
This is often a chronic and relapsing nature of this problem (fungal skin rash). Try Selsun shampoo 2.5% applied to areas with lesions.  Leave on for 10 minutes and rinse.  Repeat daily for 7 days.  You may have to continue 2-3x/week as maintenance.

## 2021-06-04 VITALS
BODY MASS INDEX: 25.18 KG/M2 | TEMPERATURE: 98.1 F | OXYGEN SATURATION: 99 % | SYSTOLIC BLOOD PRESSURE: 126 MMHG | WEIGHT: 173 LBS | RESPIRATION RATE: 16 BRPM | DIASTOLIC BLOOD PRESSURE: 80 MMHG | HEART RATE: 82 BPM

## 2021-06-05 VITALS
DIASTOLIC BLOOD PRESSURE: 68 MMHG | WEIGHT: 168 LBS | OXYGEN SATURATION: 98 % | RESPIRATION RATE: 18 BRPM | TEMPERATURE: 98.5 F | SYSTOLIC BLOOD PRESSURE: 112 MMHG | HEART RATE: 64 BPM | BODY MASS INDEX: 24.45 KG/M2

## 2021-06-05 VITALS
WEIGHT: 185 LBS | HEART RATE: 82 BPM | OXYGEN SATURATION: 98 % | SYSTOLIC BLOOD PRESSURE: 136 MMHG | HEIGHT: 70 IN | TEMPERATURE: 98.2 F | RESPIRATION RATE: 18 BRPM | BODY MASS INDEX: 26.48 KG/M2 | DIASTOLIC BLOOD PRESSURE: 100 MMHG

## 2021-06-08 NOTE — TELEPHONE ENCOUNTER
Left message to call back for: German   Information to relay to patient:  See message below and assist in scheduling when he calls back      Please reach out to patient.  Let him know that if his dizziness has continued that we would like to see him in clinic for an evaluation.  Depending on urgency, I could see him in late June.     Carmela Wells LPN

## 2021-06-09 NOTE — TELEPHONE ENCOUNTER
Refill Approved    Rx renewed per Medication Renewal Policy. Medication was last renewed on 11/8/19.    Jazz Bahena, [facility] Triage/Med Refill 6/24/2020     Requested Prescriptions   Pending Prescriptions Disp Refills     EUTHYROX 50 mcg tablet [Pharmacy Med Name: Euthyrox 50 MCG Oral Tablet] 90 tablet 0     Sig: Take 1 tablet by mouth once daily       Thyroid Hormones Protocol Passed - 6/23/2020  4:55 PM        Passed - Provider visit in past 12 months or next 3 months     Last office visit with prescriber/PCP: 4/25/2019 Jean Carlos Roa MD OR same dept: Visit date not found OR same specialty: 4/25/2019 Jean Carlos Roa MD  Last physical: 11/4/2019 Last MTM visit: Visit date not found   Next visit within 3 mo: Visit date not found  Next physical within 3 mo: Visit date not found  Prescriber OR PCP: Jean Carlos Roa MD  Last diagnosis associated with med order: 1. Hypothyroidism due to Hashimoto's thyroiditis  - EUTHYROX 50 mcg tablet [Pharmacy Med Name: Euthyrox 50 MCG Oral Tablet]; Take 1 tablet by mouth once daily  Dispense: 90 tablet; Refill: 0    If protocol passes may refill for 12 months if within 3 months of last provider visit (or a total of 15 months).             Passed - TSH on file in past 12 months for patient age 12 & older     TSH   Date Value Ref Range Status   11/07/2019 2.15 0.30 - 5.00 uIU/mL Final

## 2021-06-10 NOTE — PROGRESS NOTES
"Assessment/Plan:    Cervical radiculopathy  New complaint today was essentially normal exam.  Symptoms are intermittent.  He has discussed this previously with orthopedics.  Reviewed a note from 2016.  At that time, conservative treatment was recommended.  Given that it is only intermittently bothersome I have also recommended a conservative course.  Scheduled anti-inflammatories recommended in the form of naproxen.  Physical therapy referral placed.  If the radicular symptoms worsen, we will consider imaging versus orthopedic referral.  He may benefit from a steroid injection but a location would need to be identified on CT or MRI.    Tinea versicolor  The patient's symptoms of tinea versicolor did not resolve over the winter months.  They have been worse in the summer as he has been working outside.  His exam is similar to what was observed over the winter months.  Given lack of improvement with topical therapy we will increase to oral therapy.  Fluconazole weekly dosing for total 4 doses was prescribed.  At this point, I do not believe that the diagnosis is in question but we would consider KOH scraping, skin biopsy if refractory.  Would also consider treating empirically with a topical steroid.    Return in about 4 weeks (around 9/8/2020) for recheck if not improving.    Jean Carlos Roa MD  _______________________________    Chief Complaint   Patient presents with     Rash     lower back x \"years\"     Numbness     right hand      Subjective: German Tinajero is a 45 y.o. year old male who returns to clinic for the following chronic complaints/concerns:     Rash:   - low back   - present for years.     - moving   - some itching.  Worse with sweating.    - selsun helped a bit but not completely   - summer work: outside in the sun.  Moving.     Hand numbness:   - known scoliosis.  4 years ago [place] [facility]  He had abnormalities on xray in the upper neck.  He has been doing exercises which " "has helped with posture and spinal problems.  He has been waking up with a numb arm.  This can sometimes be worse with driving.  \"Pinching a nerve.\"  OTC pain: not very helpful.  He wants to avoid pain medications.  \"I do yoga.\"      Breathing: Doing well overall.  He believes that his inhaler has been helpful.  No wheezes.  No exacerbations.  Triggers that he identifies include weather changes.    Review of systems is negative except for as shown in the HPI.    The following portions of the patient's history were reviewed and updated as appropriate: allergies, current medications, past medical history and problem list.    Objective:    weight is 173 lb (78.5 kg). His oral temperature is 98.1  F (36.7  C). His blood pressure is 126/80 and his pulse is 82. His respiration is 16 and oxygen saturation is 99%.   General: No acute distress  Psych: Normal affect.  Normal rate of speech.  No tangentiality.  Denies suicidality.  Thinking is logical.    Skin: scaled patches of red with central clearing in the low back.    ACT: reviewed as shown in the flow sheet    PHQ-9 Total Score: 3 (8/11/2020  5:20 PM)    ARSEN 7 Total Score: 15 (11/4/2019  5:00 PM)    ACT Total Score: 23 (8/11/2020  5:20 PM)    No data recorded    No results found for this or any previous visit (from the past 24 hour(s)).    Additional History from Old Records Summarized (2): yes  Decision to Obtain Records (1): no  Radiology Tests Summarized or Ordered (1): no  Labs Reviewed or Ordered (1): no  Medicine Test Summarized or Ordered (1): no  Independent Review of EKG or X-RAY(2 each): no    This note has been dictated using voice recognition software. Any grammatical or context distortions are unintentional and inherent to the software  "

## 2021-06-14 NOTE — PROGRESS NOTES
Hospital Follow-up Visit:    Assessment/Plan:      Problem List Items Addressed This Visit     Moderate persistent asthma without complication     Not a focus of today's visit but patient states that he is doing well.  Refill of albuterol sent to pharmacy.         Relevant Medications    albuterol (PROAIR HFA;PROVENTIL HFA;VENTOLIN HFA) 90 mcg/actuation inhaler    Severe episode of recurrent major depressive disorder, without psychotic features (H)     Emergency department/hospital follow-up.  The patient was not formally admitted/observed however he did spend several days under observation while they were attempting to find a mental health bed given his suicidal gesture of ingestion.  The ingestion itself was a bit unclear but it did include prescription medications.  He had elevated transaminases and we will check an hepatic profile to confirm that this is improving as he gets more distant.  He denies suicidal ideation today.  His significant other was also recently hospitalized (3 weeks) for mental health reasons and was discharged.  They live together.  Patient states that he has follow-up with his psychiatrist and with his psychologist and he believes that he has the resources to follow through the treatment plan as determined by the hospital last week.  Family is in the area and is supportive.  Stress has been high in the context of the COVID-19 pandemic, inability work because of the pandemic as well as his mental health.  Close follow-up recommended.         Relevant Medications    OLANZapine (ZYPREXA) 2.5 MG tablet    venlafaxine (EFFEXOR-XR) 75 MG 24 hr capsule    Borderline personality disorder (H)     Overall mental health plan reviewed.  Relatively speaking coming doing well today.  Continue to follow with psychiatry/psychology.         Relevant Medications    OLANZapine (ZYPREXA) 2.5 MG tablet    venlafaxine (EFFEXOR-XR) 75 MG 24 hr capsule    Hypothyroidism due to Hashimoto's thyroiditis     Check  TSH.  Asymptomatic.         Relevant Orders    Thyroid Stimulating Hormone (TSH) (Completed)    Tinea versicolor     Times are intermittent.  Patient has not responded to topical therapies.  We will treat with a short course of Diflucan as we did successfully in the past.         Cervical radiculopathy     Neck pain we discussed earlier this summer has been worsening.  He has some radicular symptoms into his right side.  He requests referral.  We discussed anti-inflammatories versus physical therapy versus referral.  Referral placed.         Relevant Orders    Ambulatory referral to [facility]    Suicide attempt by drug ingestion, subsequent encounter      Other Visit Diagnoses     Hospital discharge follow-up    -  Primary    Relevant Medications    fluconazole (DIFLUCAN) 150 MG tablet    Other Relevant Orders    Hepatic Profile (Completed)    Mild intermittent asthma without complication        Relevant Medications    albuterol (PROAIR HFA;PROVENTIL HFA;VENTOLIN HFA) 90 mcg/actuation inhaler    Elevated ALT measurement        Relevant Medications    fluconazole (DIFLUCAN) 150 MG tablet    Other Relevant Orders    Hepatic Profile (Completed)    Generalized anxiety disorder        Relevant Medications    OLANZapine (ZYPREXA) 2.5 MG tablet    venlafaxine (EFFEXOR-XR) 75 MG 24 hr capsule        Subjective:     German Tinajero is a 45 y.o. male who presents for a hospital discharge follow up.    Hospital/Nursing Home/IP Rehab Facility: [facility]   Date of Admission: 01/06/2021  Date of Discharge:01/08/2021  Reason(s) for Admission:  Ingestion of substance, intentional self-harm, initial encounter (HRC) (Primary Dx);   Acute alcoholic intoxication without complication (HRC);   Homicidal ideation;   Suicidal ideation     Narrative hisotry:   - doing okay for the past week.    - on new set of meds.  Doing okay so far.   - significant other has been ill   - Dr. Vincent (at [facility]).  [facility].  Alexandra Roman.    -  liver labs elevated in the context of overdose.     - lives with fimiguel.   - work: in process of getting disability. Lost job with COVID19.   - no thoughts of suicide.   - neck pain continues.  Right arm gets numb.  Worse with certain movements.  He has not been seen for a couple of years.          Do you have any problems taking your medication regularly?  None       Have you had any changes in your medication since discharge? None       Have you had any difficulty following your discharge or treatment plan?  No    Summary of hospitalization:  Documentation reviewed.  Patient was in the hospital from 1/6-1/8.  He was not actually admitted but rather housed in the emergency department while the social work team attempted to find an inpatient bed for him given his suicidality.  Ultimately, he was not holdable and there was not a destination.  He was discharged to his parents.  His mental health medicines were renewed/altered.  Liver labs were abnormal.  His other testing was within normal limits.  Diagnostic Tests/Treatments reviewed.  Follow up needed: Repeat hepatic profile.  Other Healthcare Providers Involved in Patient's Care: Patient Care Team:  Jean Carlos Roa MD as PCP - General (Family Medicine)  Jean Carlos Roa MD as Assigned PCP  Liana Roman  as Psychologist (Psychiatry)  Nick Vincent MD as Physician (Psychiatry)    Update since discharge: {improved   Information from family, SNF, care coordination: Reviewed.     Post Discharge Medication Reconciliation: discharge medications reconciled, continue medications without change  Plan of care communicated with: patient    Objective:     Vitals:    01/15/21 1546   BP: 112/68   Pulse: 64   Resp: 18   Temp: 98.5  F (36.9  C)   TempSrc: Oral   SpO2: 98%   Weight: 168 lb (76.2 kg)     Physical Exam:  General: No acute distress  Psych: Normal affect.  Normal rate of speech.  No tangentiality.  Denies suicidality.  Thinking is logical.  Denies hallucinations.      PHQ-9 and ARSEN-7 were both elevated.  Patient did indicate that he had suicidal thoughts within the past 2 weeks.  This is consistent with his hospitalization and suicidal gesture.  However, he denies suicidality today.    Coding guidelines for this visit:  Type of Medical   Decision Making Face-to-Face Visit       within 7 Days of discharge Face-to-Face Visit        within 14 days of discharge   Moderate Complexity [id number]   High Complexity [id number]       Electronically signed by Jean Carlos Roa MD 01/16/21 3:50 PM

## 2021-06-15 NOTE — TELEPHONE ENCOUNTER
RN cannot approve Refill Request    RN can NOT refill this medication medication not on med list. Last office visit: 1/15/2021 Jean Carlos Roa MD Last Physical: 11/4/2019 Last MTM visit: Visit date not found Last visit same specialty: 1/15/2021 Jean Carlos Roa MD.  Next visit within 3 mo: Visit date not found  Next physical within 3 mo: Visit date not found      Faith Cobos, [facility] Triage/Med Refill 2/9/2021    Requested Prescriptions   Pending Prescriptions Disp Refills     levothyroxine (SYNTHROID, LEVOTHROID) 50 MCG tablet [Pharmacy Med Name: Levothyroxine Sodium 50 MCG Oral Tablet] 90 tablet 0     Sig: Take 1 tablet by mouth once daily       Thyroid Hormones Protocol Passed - 2/9/2021  1:50 PM        Passed - Provider visit in past 12 months or next 3 months     Last office visit with prescriber/PCP: 1/15/2021 Jean Carlos Roa MD OR same dept: 1/15/2021 Jean Carlos Roa MD OR same specialty: 1/15/2021 Jean Carlos Roa MD  Last physical: 11/4/2019 Last MTM visit: Visit date not found   Next visit within 3 mo: Visit date not found  Next physical within 3 mo: Visit date not found  Prescriber OR PCP: Jean Carlos Roa MD  Last diagnosis associated with med order: 1. Hypothyroidism due to Hashimoto's thyroiditis  - levothyroxine (SYNTHROID, LEVOTHROID) 50 MCG tablet [Pharmacy Med Name: Levothyroxine Sodium 50 MCG Oral Tablet]; Take 1 tablet by mouth once daily  Dispense: 90 tablet; Refill: 0    If protocol passes may refill for 12 months if within 3 months of last provider visit (or a total of 15 months).             Passed - TSH on file in past 12 months for patient age 12 & older     TSH   Date Value Ref Range Status   01/15/2021 1.62 0.30 - 5.00 uIU/mL Final

## 2021-06-16 PROBLEM — M54.12 CERVICAL RADICULOPATHY: Status: ACTIVE | Noted: 2020-08-12

## 2021-06-16 PROBLEM — Z00.00 ROUTINE GENERAL MEDICAL EXAMINATION AT A HEALTH CARE FACILITY: Status: ACTIVE | Noted: 2018-10-29

## 2021-06-16 PROBLEM — B36.0 TINEA VERSICOLOR: Status: ACTIVE | Noted: 2020-08-11

## 2021-06-16 PROBLEM — E66.3 OVERWEIGHT WITH BODY MASS INDEX (BMI) OF 26 TO 26.9 IN ADULT: Status: ACTIVE | Noted: 2021-04-06

## 2021-06-16 PROBLEM — G44.209 MUSCLE CONTRACTION HEADACHE: Status: ACTIVE | Noted: 2018-10-29

## 2021-06-16 PROBLEM — R41.840 POOR CONCENTRATION: Status: ACTIVE | Noted: 2018-10-29

## 2021-06-16 PROBLEM — J45.40 MODERATE PERSISTENT ASTHMA WITHOUT COMPLICATION: Status: ACTIVE | Noted: 2018-10-29

## 2021-06-16 PROBLEM — L98.9 SKIN LESION: Status: ACTIVE | Noted: 2018-10-29

## 2021-06-16 PROBLEM — F60.3 BORDERLINE PERSONALITY DISORDER (H): Status: ACTIVE | Noted: 2018-10-29

## 2021-06-16 PROBLEM — F33.2 SEVERE EPISODE OF RECURRENT MAJOR DEPRESSIVE DISORDER, WITHOUT PSYCHOTIC FEATURES (H): Status: ACTIVE | Noted: 2018-10-29

## 2021-06-16 PROBLEM — R74.01 ELEVATED ALT MEASUREMENT: Status: ACTIVE | Noted: 2021-04-06

## 2021-06-16 PROBLEM — E06.3 HYPOTHYROIDISM DUE TO HASHIMOTO'S THYROIDITIS: Status: ACTIVE | Noted: 2019-01-31

## 2021-06-16 NOTE — TELEPHONE ENCOUNTER
Telephone Encounter by Guadalupe Sanford at 9/9/2019  3:28 PM     Author: Guadalupe Sanford Service: -- Author Type: --    Filed: 9/9/2019  3:28 PM Encounter Date: 9/5/2019 Status: Signed    : Guadalupe Sanford       This medication is no longer covered under patient's insurance, please see below for additional information:    Medication: Breo Ellipta     Covered alternatives:            Starting July 1st, [place] Managed Care plans have been completely overhauled.  The formularies for these plans have completely changed, all managed care plans are trying to go under one main formulary across all managed care plans.  This medication for the patient is no longer on their insurance's formulary.  In order to be approved for a prior authorization the patient must try and fail 3 formulary alternatives or have a contraindication to the alternatives. There must be documentation, including chart notes to document failed alternatives and support contraindication to alternatives.  Would the provider like to change the medication to one of the listed alternatives?  If provider would like to pursue a prior authorization please route back to the PA team at [facility]  If provider would like to change the medication to a preferred alternative, please send a new prescription to the pharmacy and close this encounter.

## 2021-06-16 NOTE — TELEPHONE ENCOUNTER
RN triage   Call from pt   Pt states he got a message to check MyChart --   Pt states he looked at MyChart -- not sure what he was looking for -- pt states he checked his lab results and they seemed OK  Reviewed PCP note/letter attached to lab results w/ pt   Pt states he ' cold turkey' stopped zyprexa and effexor on 4/2 -- did not discuss with his psychiatrist   Pt states by 4/4 = having burning all over abd and nausea and headache   Initially abd burning off and on and worse in evenings  For the past few days burning worse -- all day everyday -- upper and lower abd -   Nausea and decreased appetite -- no vomiting -- is not eating much -- is drinking fluids   Sometimes feels feverish -- has not checked temperature   Headache all day everyday -- no one sided  numbness or tingling - having upper back pain and numbness both hands off and on   No balance or vision changes   Pt states he has been decreasing drinking alcohol lately --     Per protocol = should to to ED -- check / PCP first   Please advise = when and where do you want pt seen ?    Queta Monteiro RN [facility] RN triage    COVID 19 Nurse Triage Plan/Patient Instructions    Please be aware that novel coronavirus (COVID-19) may be circulating in the community. If you develop symptoms such as fever, cough, or SOB or if you have concerns about the presence of another infection including coronavirus (COVID-19), please contact your health care provider or visit  [email or web address].    Disposition/Instructions    ED Visit recommended. Follow protocol based instructions.      Bring Your Own Device:  Please also bring your smart device(s) (smart phones, tablets, laptops) and their charging cables for your personal use and to communicate with your care team during your visit.      Thank you for taking steps to prevent the spread of this virus.  o Limit your contact with others.  o Wear a simple mask to cover your cough.  o Wash your hands well and  often.    Resources    [facility]: About COVID-19: [email or web address]    CDC: What to Do If You're Sick: www.cdc.gov/coronavirus/2019-ncov/about/steps-when-sick.html    CDC: Ending Home Isolation: www.cdc.gov/coronavirus/2019-ncov/hcp/disposition-in-home-patients.html     CDC: Caring for Someone: www.cdc.gov/coronavirus/2019-ncov/if-you-are-sick/care-for-someone.html     [facility]: Interim Guidance for Hospital Discharge to Home: www.health.[facility].[place]/diseases/coronavirus/hcp/hospdischarge.pdf    [facility] clinical trials (COVID-19 research studies): clinicalaffairs.[facility]-clinical-trials     Below are the COVID-19 hotlines at the [place] Department of Health ([facility]). Interpreters are available.   o For health questions: Call 908-598-9318 or 1-592.968.1690 (7 a.m. to 7 p.m.)  o For questions about schools and childcare: Call 185-274-4572 or 1-364.407.6695 (7 a.m. to 7 p.m.)         Additional Information    Negative: Passed out (i.e., fainted, collapsed and was not responding)    Negative: Shock suspected (e.g., cold/pale/clammy skin, too weak to stand, low BP, rapid pulse)    Negative: Sounds like a life-threatening emergency to the triager    Negative: Chest pain    Negative: Vomiting red blood or black (coffee ground) material    Negative: SEVERE abdominal pain (e.g., excruciating)    Negative: Bloody, black, or tarry bowel movements    Negative: Unable to urinate (or only a few drops) and bladder feels very full    Constant abdominal pain lasting > 2 hours    Protocols used: ABDOMINAL PAIN - MALE-A-OH

## 2021-06-16 NOTE — PATIENT INSTRUCTIONS - HE
Metabolic syndrome: - this could be a consequence of olanzapine.   YOU DO NOT MEET CRITERIA but you have risk factors including belly fat and high blood pressure.  Your liver labs have been elevated.      _  Metabolic syndrome is a risk factor for diabetes, cardiovascular disease, stroke, metabolic associated fatty liver disease (sometimes called nonalcoholic fatty liver disease or NAFLD).  PCOS is associated with metabolic syndrome.    Lifestyle Changes that can help:    Nutrition:   - caloric restriction (eat less)   - nutritional restriction (eat different)   - time restriction (eat at different times - i.e. intermittent fasting or fasting)    Movement:    - Both cardiovascular exercise and high intensity exercise can help improve insulin resistance and drive blood sugars into your skeletal muscle.    Sleep:   - Sleeping more can help reduce insulin resistance.    Distress:   - High levels of stress can drive the production of cortisol which is generally considered a stress hormone.  When present consistently, it functions as a fat storage hormone and can contribute to metabolic syndrome.  Individuals with inadequate time for self-care, those who work long hours, those who do shift work will often struggle with metabolic syndrome, weight, stress.  This is a barrier to sustained weight loss.

## 2021-06-16 NOTE — TELEPHONE ENCOUNTER
The history is interesting since the patient and I talked about his medications when he was in clinic on 4/6.  At that time, he indicated he was still on his medications.  At that time, he had no suicidal ideation.  Prior to starting these medicines, he did have suicidal ideation.      The patient should be seen today for an evaluation and discussion of whether or not to restart his medications and evaluate overall mental health.  Unfortunately, given his symptoms the emergency department is probably where he should be evaluated.

## 2021-06-17 NOTE — TELEPHONE ENCOUNTER
Telephone Encounter by Kassidy Moran CMA at 1/14/2021  8:01 AM     Author: Kassidy Moran CMA Service: -- Author Type: Medical Assistant    Filed: 1/14/2021  8:02 AM Encounter Date: 1/14/2021 Status: Signed    : Kassidy Moran CMA (Medical Assistant)       Jean Carlos Roa MD [facility] [place] [place] [facility] Care Team Pool               Please schedule hospital follow-up/emergency department follow-up.

## 2021-06-17 NOTE — PROGRESS NOTES
"Assessment/Plan:    Drug-induced erectile dysfunction  Resolved off medications.     Moderate persistent asthma without complication  Improved.  Not using symbicort.  Also does not need albuterol.  I will leave symbicort on his chart as I think he may need seasonally.     Elevated BP without diagnosis of hypertension  P reasonably well controlled.  No indication for pharmacotherapy at this time.    Elevated ALT measurement  Anticipate improvement with dietary changes and exercise.  Check hepatic profile.  He has not been screened for hepatitis as well.    Hypothyroidism due to Hashimoto's thyroiditis  Asymptomatic.  He is due for TSH measurement.    Return in about 6 months (around 11/18/2021) for Annual physical.    Jean Carlos Roa MD  _______________________________    Chief Complaint   Patient presents with     Follow-up     blood pressure      Subjective: German Tinajero is a 45 y.o. year old male who returns to clinic for the following chronic complaints/concerns:     Follow-up:   - mood: struggled to wean. He thought that they made him feel poor.  Side effects: hunger, nausea, weight gain, fatigue.  Mood has been much better. He attributes to plant based diet.  He continues with therapy.  Therapist is aware.  - BP: better.   - doing great.   -  \"Insanity work-out.\"  Neck and back pain better with chiropractic.     - now .   - asthma: doing well.  Not using symbicort but says that he is doing well.  He has not needed albuterol. He attributes to exercise and summer.    Review of systems is negative except for as shown in the HPI.    The following portions of the patient's history were reviewed and updated as appropriate: allergies, current medications, past medical history and problem list.    Objective:    weight is 184 lb (83.5 kg). His blood pressure is 134/86 and his pulse is 92. His respiration is 20 and oxygen saturation is 98%.   Physical Exam  Constitutional:       General: He is not in acute " distress.     Appearance: Normal appearance.   HENT:      Head: Normocephalic and atraumatic.   Eyes:      General: No scleral icterus.     Conjunctiva/sclera: Conjunctivae normal.   Pulmonary:      Effort: Pulmonary effort is normal.   Skin:     Findings: No rash.   Neurological:      General: No focal deficit present.      Mental Status: He is alert and oriented to person, place, and time.   Psychiatric:         Mood and Affect: Mood normal.         Behavior: Behavior normal.         PHQ-9 Total Score: 6 (4/6/2021  8:00 AM)    ARSEN 7 Total Score: 4 (4/6/2021  8:00 AM)    ACT Total Score: 25 (5/18/2021 11:39 AM)    No data recorded    Recent Results (from the past 24 hour(s))   Hepatic Profile   Result Value Ref Range    Bilirubin, Total 1.0 0.0 - 1.0 mg/dL    Bilirubin, Direct 0.3 <=0.5 mg/dL    Protein, Total 7.3 6.0 - 8.0 g/dL    Albumin 4.2 3.5 - 5.0 g/dL    Alkaline Phosphatase 77 45 - 120 U/L    AST 21 0 - 40 U/L    ALT 31 0 - 45 U/L   Hepatitis Acute Evaluation   Result Value Ref Range    Hepatitis A Antibody, IgM Negative Negative    Hepatitis B Core Joaquina, IgM Negative Negative    Hepatitis B Surface Ag Negative Negative    Hepatitis C Ab Negative Negative   Thyroid Stimulating Hormone (TSH)   Result Value Ref Range    TSH 2.18 0.30 - 5.00 uIU/mL       This note has been dictated using voice recognition software. Any grammatical or context distortions are unintentional and inherent to the software

## 2021-06-18 NOTE — LETTER
Letter by Theresa Chen at      Author: Theresa Chen Service: -- Author Type: --    Filed:  Encounter Date: 2/12/2019 Status: (Other)       02/12/19    German Tinajero  [address] [place] [address]      Dear German    At [facility], we care about your health and well-being.  Your primary care provider is committed to ensuring you receive high quality care and has chosen a network of specialists to assist in providing that care.  Recently Dr. Matta referred you to psychology  or specialty care.  [facility] stated they tried to reach you to assist with scheduling, but were unable to reach you.  If you wish to schedule with [facility], please call them at [phone].    If you would like assistance in scheduling the appointment, please call our Specialty , Theresa, directly at [phone].    Sincerely,    [facility] Care Team

## 2021-06-20 NOTE — LETTER
Letter by Jean Carlos Roa MD at      Author: Jean Carlos Roa MD Service: -- Author Type: --    Filed:  Encounter Date: 2/10/2020 Status: (Other)         German ALEX Lior   [address] [place] [address]      2/10/2020       Dear German,       In reviewing your records, we have determined a gap in your preventive services. Based on your age and health history, we recommend the follow service.     ? General Physical  ? Physical with a Pap Smear  ? Colon cancer screening  ? Mammogram  ? Immunization  ? Diabetic check  ? Blood pressure/cardiovascular check  ? Asthma check  ? Cholesterol test  ? Lab work  ? Med check      If you have had the service elsewhere, please contact us so we can update our records. Please let us know if you have transferred your care to another clinic.    Please call [phone] to schedule this appointment.    We believe that a strong preventive care program, including regular physicals and follow-up care is an important part of a healthy lifestyle and we are committed to helping you maintain your health.    Thank you for choosing us as your health care provider.    Sincerely,     [facility]

## 2021-06-20 NOTE — LETTER
Letter by Jean Carlos Roa MD at      Author: Jean Carlos Roa MD Service: -- Author Type: --    Filed:  Encounter Date: 8/11/2020 Status: (Other)       My Asthma Action Plan    Name: German Tinajero   YOB: 1975  Date: 8/11/2020   My doctor: Jean Carlos Roa MD   My clinic: [facility]        My Control Medicine: Budesonide + formoterol (Symbicort HFA) -  80/4.5 mcg twice daily  My Rescue Medicine: Albuterol (Proair/Ventolin/Proventil HFA) 2-4 puffs EVERY 4 HOURS as needed. Use a spacer if recommended by your provider.   My Oral Steroid Medicine: prednisone My Asthma Severity:   Moderate Persistent  Know your asthma triggers: weather changes, allergies               GREEN ZONE   Good Control    I feel good    No cough or wheeze    Can work, sleep and play without asthma symptoms     Take your asthma control medicine every day.     1. If exercise triggers your asthma, take your rescue medication    15 minutes before exercise or sports, and    During exercise if you have asthma symptoms  2. Spacer to use with inhaler: If you have a spacer, make sure to use it with your inhaler             YELLOW ZONE Getting Worse  I have ANY of these:    I do not feel good    Cough or wheeze    Chest feels tight    Wake up at night 1. Keep taking your Green Zone medications  2. Start taking your rescue medicine:    every 20 minutes for up to 1 hour. Then every 4 hours for 24-48 hours.  3. If you stay in the Yellow Zone for more than 12-24 hours, contact your doctor.  4. If you do not return to the Green Zone in 12-24 hours or you get worse, start taking your oral steroid medicine if prescribed by your provider.           RED ZONE Medical Alert - Get Help  I have ANY of these:    I feel awful    Medicine is not helping    Breathing getting harder    Trouble walking or talking    Nose opens wide to breathe     1. Take your rescue medicine NOW  2. If your provider has prescribed an oral steroid  medicine, start taking it NOW  3. Call your doctor NOW  4. If you are still in the Red Zone after 20 minutes and you have not reached your doctor:    Take your rescue medicine again and    Call 911 or go to the emergency room right away    See your regular doctor within 2 weeks of an Emergency Room or Urgent Care visit for follow-up treatment.          Annual Reminders:  Meet with Asthma Educator,  Flu Shot in the Fall, consider Pneumonia Vaccination for patients with asthma (aged 19 and older).    Pharmacy:   [facility] [address] [place] [address] [id number] [facility]  [address] [facility] [address]  Phone: [phone] Fax: [phone]      Electronically signed by Jean Carlos Roa MD   Date: 08/11/20                    Asthma Triggers  How To Control Things That Make Your Asthma Worse    Triggers are things that make your asthma worse.  Look at the list below to help you find your triggers and what you can do about them.  You can help prevent asthma flare-ups by staying away from your triggers.      Trigger                                                          What you can do   Cigarette Smoke  Tobacco smoke can make asthma worse. Do not allow smoking in your home, car or around you.  Be sure no one smokes at a ernst day care or school.  If you smoke, ask your health care provider for ways to help you quit.  Ask family members to quit too.  Ask your health care provider for a referral to Quit Plan to help you quit smoking, or call 9-576-951-PLAN.     Colds, Flu, Bronchitis  These are common triggers of asthma. Wash your hands often.  Dont touch your eyes, nose or mouth.  Get a flu shot every year.     Dust Mites  These are tiny bugs that live in cloth or carpet. They are too small to see. Wash sheets and blankets in hot water every week.   Encase pillows and mattress in dust mite proof covers.  Avoid having carpet if you can. If you have carpet, vacuum weekly.   Use a dust mask and HEPA  vacuum.   Pollen and Outdoor Mold  Some people are allergic to trees, grass, or weed pollen, or molds. Try to keep your windows closed.  Limit time out doors when pollen count is high.   Ask you health care provider about taking medicine during allergy season.     Animal Dander  Some people are allergic to skin flakes, urine or saliva from pets with fur or feathers. Keep pets with fur or feathers out of your home.    If you cant keep the pet outdoors, then keep the pet out of your bedroom.  Keep the bedroom door closed.  Keep pets off cloth furniture and away from stuffed toys.     Mice, Rats, and Cockroaches   Some people are allergic to the waste from these pests.   Cover food and garbage.  Clean up spills and food crumbs.  Store grease in the refrigerator.   Keep food out of the bedroom.   Indoor Mold  This can be a trigger if your home has high moisture. Fix leaking faucets, pipes, or other sources of water.   Clean moldy surfaces.  Dehumidify basement if it is damp and smelly.   Smoke, Strong Odors, and Sprays  These can reduce air quality. Stay away from strong odors and sprays, such as perfume, powder, hair spray, paints, smoke incense, paint, cleaning products, candles and new carpet.   Exercise or Sports  Some people with asthma have this trigger. Be active!  Ask your doctor about taking medicine before sports or exercise to prevent symptoms.    Warm up for 5-10 minutes before and after sports or exercise.     Other Triggers of Asthma  Cold air:  Cover your nose and mouth with a scarf.  Sometimes laughing or crying can be a trigger.  Some medicines and food can trigger asthma.

## 2021-06-21 NOTE — LETTER
Letter by Nicik Rodrigues at      Author: Nicki Rodrigues Service: -- Author Type: --    Filed:  Encounter Date: 1/25/2021 Status: (Other)         German ALEX Tinajero  [address] [place] [id number]      January 25, 2021      Dear Mr. Tinajero,    At [facility], we care about your health and well-being. Recently, we received a  referral to get you scheduled at the [facility]. We have attempted to  assist you in scheduling this appointment and have been unsuccessful in reaching you.    Please call our Intake line at your earliest convenience for assistance in scheduling an  appointment. Thank you for choosing [facility].      Sincerely,        [facility] Intake team  [phone]

## 2021-06-23 NOTE — PROGRESS NOTES
"Assessment/Plan:    Poor concentration  Patient is expressing similar complaint of poor concentration.  He is comorbid anxiety and depression and is interested in neuropsychologic testing.  He was actually scheduled for this type of testing but missed his appointment and is hoping to be rescheduled.  -Referral was placed.  Patient understands that given his missed appointment in the past he may not get another opportunity.    Mild intermittent asthma with exacerbation  Persistent symptoms.  Inadequately treated.  Recommending a trial of an inhaler to be taken before exercise.  If this is ineffective or if his symptoms worsen, he will start a controller.  Follow-up in clinic in 4 weeks for reassessment.  Consider spirometry at that time.    Hypothyroidism due to Hashimoto's thyroiditis  The patient continues to have symptoms consistent with hypothyroidism although they are less severe than previously.  We will recheck today and anticipate that I will offer the patient thyroid replacement.  Start at 50 or 75 MCG per day.  Recheck in 6-8 weeks.  Check thyroid peroxidase antibodies.    Return in about 4 weeks (around 2/28/2019) for recheck follow-up visit.    Jean Carlos Roa MD  _______________________________    Chief Complaint   Patient presents with     Shortness of Breath     x \"years\"      Questions For Providers/results     thryoid test-does he need a RX?      Referral     ADHD      Subjective: German Tinajero is a 43 y.o. year old male who returns to clinic for the following chronic complaints/concerns:     SOB:   - \"been going on.\" Worsening, in particular over the past few weeks.  \"I had the flu\" earlier this month.  He has struggled to breath. He wonders if he has asthma.  No inhaler.  Not wheezy.  \"I did not get a full breath.\"  He recalls that he was prescribed an inhaler last summer but he never actually filled it.   - thryoid: energy has improved the past month and taking a supplement..  Taking a " "supplement.   He is not opposed to starting a thyroid medication.  He is interested in a repeat of thyroid testing today.  - poor concentration: \"missed appointment because I was so tired.\"   - ongoing sinus congestion.  Taking allergy medications.      Review of systems is negative except for as shown in the HPI.    The following portions of the patient's history were reviewed and updated as appropriate: allergies, current medications, past medical history and problem list.    Objective:    height is 5' 8\" (1.727 m) and weight is 183 lb (83 kg). His oral temperature is 98  F (36.7  C). His blood pressure is 124/70 and his pulse is 66. His respiration is 20 and oxygen saturation is 98%.   Gen: No acute distress, pleasant.  Cardiac: Regular rate and rhythm, normal S1/S2, no murmurs of the gallops  Respiratory: Clear to auscultation bilaterally.  Psych: Normal affect.  Normal rate of speech.  No tangentiality.    Recent Results (from the past 24 hour(s))   Thyroid Peroxidase Antibody   Result Value Ref Range    Thyroid Peroxidase Ab 624.9 (H) 0.0 - 5.6 IU/mL   Thyroid Cascade   Result Value Ref Range    TSH 5.37 (H) 0.30 - 5.00 uIU/mL   T4, Free   Result Value Ref Range    Free T4 1.0 0.7 - 1.8 ng/dL       Additional History from Old Records Summarized (2): no  Decision to Obtain Records (1): no  Radiology Tests Summarized or Ordered (1): no  Labs Reviewed or Ordered (1): yes  Medicine Test Summarized or Ordered (1): no  Independent Review of EKG or X-RAY(2 each): no    This note has been dictated using voice recognition software. Any grammatical or context distortions are unintentional and inherent to the software  "

## 2021-06-23 NOTE — TELEPHONE ENCOUNTER
Left message to call back for: German  Information to relay to patient:  Dr. Duarte was reviewing your chart and your due for an asthma check up. Please schedule an appointment.    Carmela Wells LPN

## 2021-06-24 NOTE — PROGRESS NOTES
Assessment/Plan:    Hypothyroidism due to Hashimoto's thyroiditis  Energy remains low.  Patient is interested in starting therapy.  Discussed potential side effects.   - start synthroid at 50 mcg   - recheck TSH is 6-10 weeks.    Mild persistent asthma with exacerbation  Symptoms are persistent.  Poorly controlled asthma.  Using rescue inhaler regularly.    - start flovent.   -Continue singular.   - recheck in 6 weeks.    Return in about 6 weeks (around 4/18/2019) for Thyroid recheck in 6 weeks.  Asthma recheck is 6 weeks..    Jean Carlos Roa MD  _______________________________    Chief Complaint   Patient presents with     Follow-up     asthma     Questions For Providers/results     thyroid medications      Subjective: German Tinajero is a 43 y.o. year old male who returns to clinic for the following chronic complaints/concerns:     asthma:   - doing better overall.  Using less. He no longer uses it everyday.  Singulair has been helpful.  He does not wake up at night.  He does have good days and bad days.  At times he will feel short of breath which she attributes to his asthma.  He is expecting seasonally worse symptoms as spring progresses.  He is interested in starting a controller medication.    Thyroid: energy is low although improved with his thyroid supplement which she has been taking for the past couple months.  He did conclude this therapy.  His father has hypothyroidism and does well with thyroid replacement.  The patient continues to have low energy.  He is now interested in pharmacotherapy.      Review of systems is negative except for as shown in the HPI.    The following portions of the patient's history were reviewed and updated as appropriate: allergies, current medications, past medical history and problem list.    Objective:    weight is 185 lb (83.9 kg). His blood pressure is 122/72 and his pulse is 86. His respiration is 22 and oxygen saturation is 98%.   Gen: nad  Psych: normal affect    No  results found for this or any previous visit (from the past 24 hour(s)).    Additional History from Old Records Summarized (2): no  Decision to Obtain Records (1): no  Radiology Tests Summarized or Ordered (1): no  Labs Reviewed or Ordered (1): no  Medicine Test Summarized or Ordered (1): no  Independent Review of EKG or X-RAY(2 each): no    This note has been dictated using voice recognition software. Any grammatical or context distortions are unintentional and inherent to the software

## 2021-06-26 ENCOUNTER — HEALTH MAINTENANCE LETTER (OUTPATIENT)
Age: 46
End: 2021-06-26

## 2021-06-26 NOTE — PROGRESS NOTES
"Progress Notes by Jean Carlos Roa MD at 10/29/2018 11:20 AM     Author: Jean Carlos Roa MD Service: -- Author Type: Physician    Filed: 10/30/2018  9:14 AM Encounter Date: 10/29/2018 Status: Signed    : Jean Carlos Roa MD (Physician)       MALE PREVENTATIVE EXAM    Assessment and Plan:       Problem List Items Addressed This Visit        ENT/CARD/PULM/ENDO Problems    Mild intermittent asthma with exacerbation       Other    Routine general medical examination at a health care facility - Primary    Skin lesion    Muscle contraction headache    Poor concentration    Relevant Orders    Ambulatory referral to Psychology    Anxiety and depression    Relevant Orders    Ambulatory referral to Psychology    Borderline personality disorder (H)      Other Visit Diagnoses     Neck pain              Next follow up:  No Follow-up on file.    Immunization Review  Adult Imm Review: No immunizations due today  Pt does not use tobacco products     I discussed the following with the patient:   Adult Healthy Living: Importance of regular exercise  Healthy nutrition    I have had an Advance Directives discussion with the patient.    Subjective:   Chief Complaint: German Tinajero is an 43 y.o. male here for a preventative health visit.     HPI:      Poor concentration: \"not bad in school.\"  Struggled in school.  Failed some courses.  He required extra help.  Completed college a few years ago.  EMS/EMT certification.     Anxiety/Depression:  Mood has been up and down. Previously diagnosed with anxiety and depression.  He was on some type of medication for a few months.  Lots of side effects.  Helped in some ways as well.  Borderline personality disorder diagnosis.      Currently:  and teaches yoga.    Asthma:  History of asthma when he was young.  He was on a medication.  \"Fit.\"  There are times when he struggles to breath.  Previously he used an \"OTC inhaler.\"  Only a few times per month.  No changes in stamina " "with exercise.  No chest pain.  Able to ride mountain bikes (20 miles a month a go).  He works about 1+ hour 5x/week.  No chest pain.  No chest tightness.  Some nasal drainage today.  He has found albuterol helpful.      Headaches / neck stiffness: previously diagnosis of scoliosis.  He was given \"exercises.\" Tries to not take medications.  Xray was completed by [facility].  Some \"vertebral deterioration.\"  PT exercises have been helpful.  He gets headaches from time to time.  Muscle tension and imbalance. Pain is mild. HA are not every day.  3x/week. Yoga and exercises help.      Bump on head: started with trauma.  At times this will be irritated.  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      Healthy Habits  Are you taking a daily aspirin? No  Do you typically exercising at least 40 min, 3-4 times per week?  Yes  Do you usually eat at least 4 servings of fruit and vegetables a day, include whole grains and fiber and avoid regularly eating high fat foods? Yes  Have you had an eye exam in the past two years? Yes  Do you see a dentist twice per year? NO  Do you have any concerns regarding sleep? No    Safety Screen  If you own firearms, are they secured in a locked gun cabinet or with trigger locks? The patient does not own any firearms  Do you feel you are safe where you are living?: Yes (10/29/2018 11:34 AM)  Do you feel you are safe in your relationship(s)?: Yes (10/29/2018 11:34 AM)    Review of Systems:  Please see above.  The rest of the review of systems are negative for all systems.     Cancer Screening     Patient has no health maintenance due at this time          Patient Care Team:  Jean Carlos Roa MD as PCP - General (Family Medicine)        History     Reviewed By Date/Time Sections Reviewed    Carmela Wells LPN 10/29/2018 11:36 AM Tobacco    Carmela Wells LPN 10/29/2018 11:34 AM Tobacco    Carmela Wells LPN 10/29/2018 11:33 AM Family    " "Carmela Wells, LPN 10/29/2018 11:32 AM Family    Carmela Wells, LPN 10/29/2018 11:31 AM Surgical, Family    Carmela Wells, LPN 10/29/2018 11:30 AM Surgical, Tobacco, Alcohol, Drug Use, Sexual Activity            Objective:   Vital Signs:   Visit Vitals   ? /84 (Patient Site: Left Arm, Patient Position: Sitting, Cuff Size: Adult Large)   ? Pulse 74   ? Resp 18   ? Ht 5' 7.75\" (1.721 m)   ? Wt 179 lb (81.2 kg)   ? SpO2 98%  Comment: room air   ? BMI 27.42 kg/m2          PHYSICAL EXAM  Physical Exam   Constitutional: He is oriented to person, place, and time. He appears well-developed. No distress.   HENT:   Head: Normocephalic and atraumatic.   Right Ear: External ear normal.   Left Ear: External ear normal.   Nose: Nose normal.   Mouth/Throat: Oropharynx is clear and moist. No oropharyngeal exudate.   Eyes: Conjunctivae and EOM are normal. Pupils are equal, round, and reactive to light. Right eye exhibits no discharge. Left eye exhibits no discharge. No scleral icterus.   Neck: Normal range of motion. No thyromegaly present.   Cardiovascular: Normal rate, regular rhythm, normal heart sounds and intact distal pulses.  Exam reveals no gallop and no friction rub.    No murmur heard.  Pulmonary/Chest: Effort normal and breath sounds normal. No respiratory distress. He has no wheezes.   No wheezing.   Abdominal: Soft. He exhibits no distension and no mass. There is no tenderness.   Musculoskeletal: Normal range of motion. He exhibits no edema.   Lymphadenopathy:     He has no cervical adenopathy.   Neurological: He is alert and oriented to person, place, and time. He has normal reflexes. No cranial nerve deficit. He exhibits normal muscle tone.   Skin: Skin is warm.        Psychiatric: He has a normal mood and affect. His behavior is normal. Judgment and thought content normal. His affect is not blunt.   Vitals reviewed.      The 10-year ASCVD risk score (Tucker RIGOBERTO Jr, et al., 2013) is: " 2.5%    Values used to calculate the score:      Age: 43 years      Sex: Male      Is Non- : No      Diabetic: No      Tobacco smoker: No      Systolic Blood Pressure: 136 mmHg      Is BP treated: No      HDL Cholesterol: 45 mg/dL      Total Cholesterol: 195 mg/dL         Medication List      Notice  As of 10/29/2018 12:04 PM    You have not been prescribed any medications.          Additional Screenings Completed Today:

## 2021-06-28 NOTE — PROGRESS NOTES
Progress Notes by Jean Carlos Roa MD at 11/4/2019  4:20 PM     Author: Jean Carlos Roa MD Service: -- Author Type: Physician    Filed: 11/4/2019  5:09 PM Encounter Date: 11/4/2019 Status: Addendum    : Jean Carlos Roa MD (Physician)    Related Notes: Original Note by Jean Carlos Roa MD (Physician) filed at 11/4/2019  5:01 PM       MALE PREVENTATIVE EXAM    Assessment and Plan:       Problem List Items Addressed This Visit     Routine general medical examination at a health care facility - Primary     The patient presents for an annual exam.  He says that since I last saw him he has had clarification of his mental health diagnoses.  He is been struggling to maintain a job and has been working with a psychologist at [facility] and [employer].  He now has diagnoses including autism spectrum disorder, PTSD, generalized anxiety disorder possible depression.  These notes are not available for review.  I did complete paperwork stating that he has had a physical exam as one component of a disability application which was started through his mental health provider.  There is no obvious organic etiology for the symptoms that they are identifying.  He will return to clinic for fasting lab work later this week.  He is overdue for a TSH measurement.  He is compliant with his levothyroxine.   -Treat tinea versicolor with Selsun shampoo.         Relevant Orders    Basic Metabolic Panel    ALT (SGPT)    Moderate persistent asthma without complication     Currently poorly controlled.  He is not on a controller.  We will try Symbicort for coverage.  Jes was prohibitively expensive.  Continue albuterol as needed.         Relevant Medications    budesonide-formoterol (SYMBICORT) 80-4.5 mcg/actuation inhaler    Anxiety and depression     The patient's affect is fairly neutral today although given his description of symptoms and dysfunction socially, the disability application does seem reasonable.  He anticipates that  "he will be starting medications later this year which will hopefully help facilitate his transition back into the workforce.  The patient is recently engaged to a woman from this community.  Is currently unemployed.         Hypothyroidism due to Hashimoto's thyroiditis    Relevant Orders    Thyroid Stimulating Hormone (TSH)      Other Visit Diagnoses     Screening for lipid disorders        Relevant Orders    Lipid Profile    Screening for diabetes mellitus        Relevant Orders    Basic Metabolic Panel    ALT (SGPT)    Tinea versicolor            Next follow up:  Return in about 3 months (around 2/4/2020) for Recheck follow-up visit, thyroid/asthma function.    Immunization Review  Adult Imm Review: No immunizations due today  Pt does not use tobacco products     I discussed the following with the patient:   Adult Healthy Living: Importance of regular exercise  Healthy nutrition  Weight loss referral options  Stress management  Herbal medications/alternative medical therapies    I have had an Advance Directives discussion with the patient.    Subjective:   Chief Complaint: German Tinajero is an 44 y.o. male here for a preventative health visit.     HPI:      Disability: ongoing therapy.  He has been meeting with them over the past year.  He was told that he has ASD, PTSD, ADHD, Anxiety, major depressive disorder.  \"I am pretty good with dealing with it.\"  Struggling to get help.  He is in the process of applying for disability.  He was told that he is disabled.  He agrees that he is disabled.    - \"I always lose jobs.\"   - \"affecting my life.\"   - He has an appointment with a psychiatrist on 12/9.  He is not currently on medications.     Asthma:   - better.  Not a problem.   - he uses albuterol.   - Not on controller.     - he does not take singulair    Healthy Habits  Are you taking a daily aspirin? No  Do you typically exercising at least 40 min, 3-4 times per week?  Yes  Do you usually eat at least 4 servings of " "fruit and vegetables a day, include whole grains and fiber and avoid regularly eating high fat foods? Yes  Have you had an eye exam in the past two years? Yes  Do you see a dentist twice per year? NO  Do you have any concerns regarding sleep? No    Safety Screen  If you own firearms, are they secured in a locked gun cabinet or with trigger locks? The patient does not own any firearms  No data recorded    Review of Systems:  Please see above.  The rest of the review of systems are negative for all systems.     Cancer Screening     Patient has no health maintenance due at this time          Patient Care Team:  Jean Carlos Roa MD as PCP - General (Family Medicine)  Jean Carlos Roa MD as Assigned PCP  Liana Roman  (Psychiatry)        History     Reviewed By Date/Time Sections Reviewed    Jean Carlos Roa MD 11/4/2019  4:46 PM Medical, Surgical, Tobacco, Alcohol, Drug Use, Sexual Activity, Carmela Fagan LPN 11/4/2019  4:29 PM Family    Carmela Wells LPN 11/4/2019  4:28 PM Surgical, Tobacco, Alcohol, Drug Use, Sexual Activity, Family    Carmela Wells LPN 11/4/2019  4:24 PM Tobacco            Objective:   Vital Signs:   Visit Vitals  /80 (Patient Site: Left Arm, Patient Position: Sitting, Cuff Size: Adult Large)   Pulse 76   Temp 98.5  F (36.9  C) (Oral)   Resp 20   Ht 5' 9.5\" (1.765 m) Comment: with shoes   Wt 169 lb (76.7 kg)   SpO2 98% Comment: room air   BMI 24.60 kg/m        PHYSICAL EXAM  Physical Exam  Vitals signs reviewed.   Constitutional:       General: He is not in acute distress.     Appearance: He is well-developed.   HENT:      Head: Normocephalic and atraumatic.      Right Ear: External ear normal.      Left Ear: External ear normal.      Nose: Nose normal.      Mouth/Throat:      Pharynx: No oropharyngeal exudate.   Eyes:      General: No scleral icterus.        Right eye: No discharge.         Left eye: No discharge.      Conjunctiva/sclera: " Conjunctivae normal.      Pupils: Pupils are equal, round, and reactive to light.   Neck:      Musculoskeletal: Normal range of motion.      Thyroid: No thyromegaly.   Cardiovascular:      Rate and Rhythm: Normal rate and regular rhythm.      Heart sounds: Normal heart sounds. No murmur. No friction rub. No gallop.    Pulmonary:      Effort: Pulmonary effort is normal. No respiratory distress.      Breath sounds: Normal breath sounds. No wheezing.   Abdominal:      General: There is no distension.      Palpations: Abdomen is soft. There is no mass.      Tenderness: There is no tenderness.   Musculoskeletal: Normal range of motion.   Lymphadenopathy:      Cervical: No cervical adenopathy.   Skin:     General: Skin is warm.   Neurological:      Mental Status: He is alert and oriented to person, place, and time.      Cranial Nerves: No cranial nerve deficit.      Motor: No abnormal muscle tone.      Deep Tendon Reflexes: Reflexes are normal and symmetric.   Psychiatric:         Behavior: Behavior normal.         Thought Content: Thought content normal.         Judgement: Judgment normal.           The 10-year ASCVD risk score (Tucker RIGOBERTO Jr., et al., 2013) is: 2.1%    Values used to calculate the score:      Age: 44 years      Sex: Male      Is Non- : No      Diabetic: No      Tobacco smoker: No      Systolic Blood Pressure: 128 mmHg      Is BP treated: No      HDL Cholesterol: 53 mg/dL      Total Cholesterol: 202 mg/dL         Medication List          Accurate as of November 4, 2019  5:08 PM. If you have any questions, ask your nurse or doctor.            START taking these medications    budesonide-formoterol 80-4.5 mcg/actuation inhaler  Also known as:  SYMBICORT  INSTRUCTIONS:  Inhale 2 puffs 2 (two) times a day.  Started by:  Jean Carlos Roa MD           CONTINUE taking these medications    albuterol 90 mcg/actuation inhaler  Also known as:  PROAIR HFA;PROVENTIL HFA;VENTOLIN  HFA  INSTRUCTIONS:  Inhale 2 puffs every 6 (six) hours as needed for wheezing.  Doctor's comments:  May substitute the equivalent medication per insurance preference.        levothyroxine 50 MCG tablet  Also known as:  SYNTHROID  INSTRUCTIONS:  Take 1 tablet (50 mcg total) by mouth daily.        montelukast 10 mg tablet  Also known as:  SINGULAIR  INSTRUCTIONS:  Take 1 tablet (10 mg total) by mouth daily.           STOP taking these medications    fluticasone furoate-vilanterol 200-25 mcg/dose Dsdv inhaler  Also known as:  JANIYA DOBBINS  Stopped by:  Jean Carlos Roa MD           Where to Get Your Medications      These medications were sent to [facility] [address] [place] [address] - [address]  [address] [facility] [address]    Phone:  [phone]     budesonide-formoterol 80-4.5 mcg/actuation inhaler         Additional Screenings Completed Today:

## 2021-06-30 NOTE — PROGRESS NOTES
Progress Notes by Jean Carlos Roa MD at 4/6/2021  7:00 AM     Author: Jean Carlos Roa MD Service: -- Author Type: Physician    Filed: 4/6/2021  8:47 AM Encounter Date: 4/6/2021 Status: Signed    : Jean Carlos Roa MD (Physician)       MALE PREVENTATIVE EXAM    Assessment and Plan:     Patient has been advised of split billing requirements and indicates understanding: Yes    Overweight with body mass index (BMI) of 26 to 26.9 in adult  Likely iatrogenic.  He has been trying to address with dietary changes and exercise.  We will reevaluate in 6 weeks.    Elevated BP without diagnosis of hypertension  Blood pressure elevated today.  It was also elevated 1 week ago when he was seen in a local urgent care.  Unclear if this is related to weight gain.  His blood pressure has been elevated in the past.  He does not snore.  We will have the patient return to clinic for reevaluation.  He is can work on diet/lifestyle changes.  He denies excessive alcohol consumption.  No recreational drugs.    Drug-induced erectile dysfunction  No contraindication to a trial of sildenafil.  Unclear insurance coverage.  Generic sildenafil sent to pharmacy.  We discussed potential side effects.    Suicide attempt by drug ingestion, subsequent encounter  No suicidal ideation today.    Severe episode of recurrent major depressive disorder, without psychotic features (H)  Interval improvement.  He says that overall things are going quite well.  He is looking forward to using of work restrictions with the COVID-19 pandemic.  Weight gain associated with olanzapine and venlafaxine.  Sexual dysfunction associated with these medications as well.  He is working with a psychiatrist.    Routine general medical examination at a health care facility  A number of concerns today were discussed.  He is working to improve his weight and lifestyle through dietary changes and increase physical activity.  Weight gain attributed to medications.  He is  "up-to-date with preventative health recommendations.  He wonders if he still needs to be on levothyroxine.  We will consider tetanus booster at his next visit.    Hypothyroidism due to Hashimoto's thyroiditis  The patient is interested in decreasing his overall medication burden and wonders if he still needs to be on levothyroxine.  We discussed waiting until his weight is under better control versus a trial off of the medicine.  He was mildly symptomatic at time of diagnosis of Hashimoto's.  We will check a TSH in 6 weeks.  DC Synthroid.  Patient becomes symptomatic, he will contact clinic and I will resume the medication.    Moderate persistent asthma without complication  Currently symptomatic and using his rescue inhaler frequently.  We will increase his Symbicort dose.  He is taking this medication as prescribed.  Reevaluation in 6 weeks recommended.    Next follow up:  Return in about 4 weeks (around 5/4/2021) for Recheck follow-up visit.    Immunization Review  Adult Imm Review: No immunizations due today  Pt does not use tobacco products     I discussed the following with the patient:   Adult Healthy Living: Importance of regular exercise  Getting adequate sleep  Stress management  Herbal medications/alternative medical therapies    I have had an Advance Directives discussion with the patient.    Subjective:   Chief Complaint: German Tinajero is an 45 y.o. male here for a preventative health visit.    Patient has been advised of split billing requirements and indicates understanding: Yes    HPI:  He wonders about E. Coli.wife has \"infestation.  He was told that he has prostatitis.  \"Occasional burning with urination.\"  He did NOT start the cipro that was prescribed.  Over the past couple of weeks he has some waves of pelvic discomfort.  Measured in seconds (10-15 s).  \"I urinate a lot.\"      Sexual dysfunction / MDD: attributed to venlafaxine.  He says that venlaxine makes him feel tired but it otherwise " "working.  He uses cetirizine \"every once in a while.\"   No SI.  \"My life is better now.\"     Asthma:  Worse recently.  Montelukast helps a bit. Using rescue inhaler.  Allergies have been mostly controlled.  Weather changes have affected him in the past couple of days.      BP: eating better.  Exercising more.  \"I feel calm.\"  Was elevated on 3/31 at urgent care. Weight have been increased.  He attributes to hunger from zyprexa.  Working on improving fitness.  He has not been drinking alcohol more than once per week.     Healthy Habits  Are you taking a daily aspirin? No  Do you typically exercising at least 40 min, 3-4 times per week?  Yes  Do you usually eat at least 4 servings of fruit and vegetables a day, include whole grains and fiber and avoid regularly eating high fat foods? Yes  Have you had an eye exam in the past two years? Yes  Do you see a dentist twice per year? NO  Do you have any concerns regarding sleep? No    Safety Screen  If you own firearms, are they secured in a locked gun cabinet or with trigger locks? The patient does not own any firearms  No data recorded    Review of Systems:  Please see above.  The rest of the review of systems are negative for all systems.     Cancer Screening     Patient has no health maintenance due at this time        Patient Care Team:  Jean Carlos Roa MD as PCP - General (Family Medicine)  Jean Carlos Roa MD as Assigned PCP  Liana Roman  as Psychologist (Psychiatry)  Nick Vincent MD as Physician (Psychiatry)    History     Reviewed By Date/Time Sections Reviewed    Jean Carlos Roa MD 4/6/2021  7:28 AM Medical, Surgical    Carmela Wells LPN 4/6/2021  7:03 AM Tobacco, Alcohol, Drug Use, Sexual Activity    Carmela Wells LPN 4/6/2021  7:01 AM Tobacco    Carmela Wells LPN 4/6/2021  7:00 AM Surgical, Tobacco, Family        Objective:   Vital Signs:   Visit Vitals  BP (!) 136/100 (Patient Site: Left Arm, Patient Position: " "Sitting, Cuff Size: Adult Large)   Pulse 82   Temp 98.2  F (36.8  C) (Oral)   Resp 18   Ht 5' 9.5\" (1.765 m) Comment: with shoes   Wt 185 lb (83.9 kg)   SpO2 98% Comment: room air   BMI 26.93 kg/m        PHYSICAL EXAM  Physical Exam  Vitals signs reviewed.   Constitutional:       General: He is not in acute distress.     Appearance: He is well-developed.   HENT:      Head: Normocephalic and atraumatic.      Right Ear: External ear normal.      Left Ear: External ear normal.      Nose: Nose normal.      Mouth/Throat:      Pharynx: No oropharyngeal exudate.   Eyes:      General: No scleral icterus.        Right eye: No discharge.         Left eye: No discharge.      Conjunctiva/sclera: Conjunctivae normal.      Pupils: Pupils are equal, round, and reactive to light.   Neck:      Musculoskeletal: Normal range of motion.      Thyroid: No thyromegaly.   Cardiovascular:      Rate and Rhythm: Normal rate and regular rhythm.      Heart sounds: Normal heart sounds. No murmur. No friction rub. No gallop.    Pulmonary:      Effort: Pulmonary effort is normal. No respiratory distress.      Breath sounds: Normal breath sounds. No wheezing.   Abdominal:      General: There is no distension.      Palpations: Abdomen is soft. There is no mass.      Tenderness: There is no abdominal tenderness.   Musculoskeletal: Normal range of motion.   Lymphadenopathy:      Cervical: No cervical adenopathy.   Skin:     General: Skin is warm.   Neurological:      Mental Status: He is alert and oriented to person, place, and time.      Cranial Nerves: No cranial nerve deficit.      Motor: No abnormal muscle tone.      Deep Tendon Reflexes: Reflexes are normal and symmetric.   Psychiatric:         Behavior: Behavior normal.         Thought Content: Thought content normal.         Judgment: Judgment normal.       PHQ-9 and ARSEN-7 reviewed as shown in the flowsheet.    ACT reviewed as shown in the flowsheet.    The 10-year ASCVD risk score ([place] DC " Jr. et al., 2013) is: 3%    Values used to calculate the score:      Age: 45 years      Sex: Male      Is Non- : No      Diabetic: No      Tobacco smoker: No      Systolic Blood Pressure: 136 mmHg      Is BP treated: No      HDL Cholesterol: 47 mg/dL      Total Cholesterol: 198 mg/dL         Medication List          Accurate as of April 6, 2021  8:47 AM. If you have any questions, ask your nurse or doctor.            START taking these medications    budesonide-formoteroL 160-4.5 mcg/actuation inhaler  Also known as: SYMBICORT  INSTRUCTIONS: Inhale 2 puffs 2 (two) times a day.  Replaces: budesonide-formoteroL 80-4.5 mcg/actuation inhaler  Started by: Jean Carlos Roa MD        sildenafil 20 mg tablet  Also known as: REVATIO  INSTRUCTIONS: Take 1-3 tablets (20-60 mg total) by mouth as needed. 1 hour prior to sexual activity  Started by: Jean Carlos Roa MD           CONTINUE taking these medications    albuterol 90 mcg/actuation inhaler  Also known as: PROAIR HFA;PROVENTIL HFA;VENTOLIN HFA  INSTRUCTIONS: Inhale 2 puffs every 6 (six) hours as needed for wheezing.  Doctor's comments: May substitute the equivalent medication per insurance preference.        venlafaxine 75 MG 24 hr capsule  Also known as: EFFEXOR-XR  INSTRUCTIONS: Take 75 mg by mouth daily.        zyPREXA 2.5 MG tablet  INSTRUCTIONS: Take 2.5 mg by mouth 3 (three) times a day.  Generic drug: OLANZapine           STOP taking these medications    budesonide-formoteroL 80-4.5 mcg/actuation inhaler  Also known as: SYMBICORT  Replaced by: budesonide-formoteroL 160-4.5 mcg/actuation inhaler  Stopped by: Jean Carlos Roa MD     levothyroxine 50 MCG tablet  Also known as: SYNTHROID, LEVOTHROID  Stopped by: Jean Carlos Roa MD     montelukast 10 mg tablet  Also known as: SINGULAIR  Stopped by: Jean Cralos Roa MD           Where to Get Your Medications      These medications were sent to [facility] - [place], [place] -  [address]  [address], [facility] [address]    Phone: [phone]     budesonide-formoteroL 160-4.5 mcg/actuation inhaler    sildenafil 20 mg tablet         Additional Screenings Completed Today:

## 2021-07-03 NOTE — ADDENDUM NOTE
Addendum Note by Jean Carlos Pereyra MD at 11/4/2019  4:20 PM     Author: Jean Carlos Pereyra MD Service: -- Author Type: Physician    Filed: 11/4/2019  5:09 PM Encounter Date: 11/4/2019 Status: Signed    : Jean Carlos Pereyra MD (Physician)    Addended by: JEAN CARLOS PEREYRA on: 11/4/2019 05:09 PM        Modules accepted: Level of Service

## 2021-07-14 PROBLEM — J45.20 MILD INTERMITTENT ASTHMA WITHOUT COMPLICATION: Status: RESOLVED | Noted: 2019-02-01 | Resolved: 2019-03-07

## 2021-09-09 ENCOUNTER — TELEPHONE (OUTPATIENT)
Dept: FAMILY MEDICINE | Facility: CLINIC | Age: 46
End: 2021-09-09

## 2021-10-16 ENCOUNTER — HEALTH MAINTENANCE LETTER (OUTPATIENT)
Age: 46
End: 2021-10-16

## 2022-05-22 ENCOUNTER — HEALTH MAINTENANCE LETTER (OUTPATIENT)
Age: 47
End: 2022-05-22

## 2022-09-25 ENCOUNTER — HEALTH MAINTENANCE LETTER (OUTPATIENT)
Age: 47
End: 2022-09-25

## 2023-06-04 ENCOUNTER — HEALTH MAINTENANCE LETTER (OUTPATIENT)
Age: 48
End: 2023-06-04